# Patient Record
Sex: FEMALE | Race: WHITE | HISPANIC OR LATINO | Employment: FULL TIME | URBAN - METROPOLITAN AREA
[De-identification: names, ages, dates, MRNs, and addresses within clinical notes are randomized per-mention and may not be internally consistent; named-entity substitution may affect disease eponyms.]

---

## 2017-08-29 ENCOUNTER — NON-PROVIDER VISIT (OUTPATIENT)
Dept: URGENT CARE | Facility: PHYSICIAN GROUP | Age: 22
End: 2017-08-29

## 2017-08-29 DIAGNOSIS — Z02.1 PRE-EMPLOYMENT DRUG SCREENING: ICD-10-CM

## 2017-08-29 LAB
AMP AMPHETAMINE: NORMAL
COC COCAINE: NORMAL
INT CON NEG: NEGATIVE
INT CON POS: POSITIVE
MET METHAMPHETAMINES: NORMAL
OPI OPIATES: NORMAL
PCP PHENCYCLIDINE: NORMAL
POC DRUG COMMENT 753798-OCCUPATIONAL HEALTH: NORMAL
THC: NORMAL

## 2017-08-29 PROCEDURE — 80305 DRUG TEST PRSMV DIR OPT OBS: CPT | Performed by: PHYSICIAN ASSISTANT

## 2018-02-27 ENCOUNTER — NON-PROVIDER VISIT (OUTPATIENT)
Dept: URGENT CARE | Facility: PHYSICIAN GROUP | Age: 23
End: 2018-02-27

## 2018-02-27 DIAGNOSIS — Z02.1 PRE-EMPLOYMENT DRUG SCREENING: Primary | ICD-10-CM

## 2018-02-27 LAB
AMP AMPHETAMINE: NORMAL
COC COCAINE: NORMAL
INT CON NEG: NORMAL
INT CON POS: NORMAL
MET METHAMPHETAMINES: NORMAL
OPI OPIATES: NORMAL
PCP PHENCYCLIDINE: NORMAL
POC DRUG COMMENT 753798-OCCUPATIONAL HEALTH: NEGATIVE
THC: NORMAL

## 2018-02-27 PROCEDURE — 80305 DRUG TEST PRSMV DIR OPT OBS: CPT | Performed by: PHYSICIAN ASSISTANT

## 2018-02-27 NOTE — PROGRESS NOTES
Kathya Rogel is a 22 y.o. female here for a non-provider visit for Drug Screen 2/27/18    If abnormal was an in office provider notified today (if so, indicate provider)? No  Routed to PCP? No

## 2018-07-15 ENCOUNTER — HOSPITAL ENCOUNTER (EMERGENCY)
Facility: MEDICAL CENTER | Age: 23
End: 2018-07-15
Attending: EMERGENCY MEDICINE
Payer: MEDICAID

## 2018-07-15 ENCOUNTER — APPOINTMENT (OUTPATIENT)
Dept: RADIOLOGY | Facility: MEDICAL CENTER | Age: 23
End: 2018-07-15
Attending: EMERGENCY MEDICINE
Payer: MEDICAID

## 2018-07-15 VITALS
HEIGHT: 62 IN | RESPIRATION RATE: 18 BRPM | BODY MASS INDEX: 34.28 KG/M2 | HEART RATE: 87 BPM | SYSTOLIC BLOOD PRESSURE: 110 MMHG | OXYGEN SATURATION: 98 % | DIASTOLIC BLOOD PRESSURE: 72 MMHG | WEIGHT: 186.29 LBS | TEMPERATURE: 97.6 F

## 2018-07-15 DIAGNOSIS — Z3A.17 17 WEEKS GESTATION OF PREGNANCY: ICD-10-CM

## 2018-07-15 PROCEDURE — 99284 EMERGENCY DEPT VISIT MOD MDM: CPT

## 2018-07-15 PROCEDURE — 76817 TRANSVAGINAL US OBSTETRIC: CPT

## 2018-07-15 ASSESSMENT — PAIN SCALES - GENERAL: PAINLEVEL_OUTOF10: 0

## 2018-07-15 NOTE — ED NOTES
Patient for discharge home. No acute distress, currently able to ambulate self without assistance.

## 2018-07-15 NOTE — DISCHARGE INSTRUCTIONS
Pregnancy  If you are planning on getting pregnant, it is a good idea to make a preconception appointment with your caregiver to discuss having a healthy lifestyle before getting pregnant. This includes diet, weight, exercise, taking prenatal vitamins (especially folic acid, which helps prevent brain and spinal cord defects), avoiding alcohol, smoking and illegal drugs, medical problems (diabetes, convulsions), family history of genetic problems, working conditions, and immunizations. It is better to have knowledge of these things and do something about them before getting pregnant.  During your pregnancy, it is important to follow certain guidelines in order to have a healthy baby. It is very important to get good prenatal care and follow your caregiver's instructions. Prenatal care includes all the medical care you receive before your baby's birth. This helps to prevent problems during the pregnancy and childbirth.  HOME CARE INSTRUCTIONS   · Start your prenatal visits by the 12th week of pregnancy or earlier, if possible. At first, appointments are usually scheduled monthly. They become more frequent in the last 2 months before delivery. It is important that you keep your caregiver's appointments and follow your caregiver's instructions regarding medication use, exercise, and diet.  · During pregnancy, you are providing food for you and your baby. Eat a regular, well-balanced diet. Choose foods such as meat, fish, milk and other dairy products, vegetables, fruits, whole-grain breads and cereals. Your caregiver will inform you of the ideal weight gain depending on your current height and weight. Drink lots of liquids. Try to drink 8 glasses of water a day.  · Alcohol is associated with a number of birth defects including fetal alcohol syndrome. It is best to avoid alcohol completely. Smoking will cause low birth rate and prematurity. Use of alcohol and nicotine during your pregnancy also increases the chances that  your child will be chemically dependent later in their life and may contribute to SIDS (Sudden Infant Death Syndrome).  · Do not use illegal drugs.  · Only take prescription or over-the-counter medications that are recommended by your caregiver. Other medications can cause genetic and physical problems in the baby.  · Morning sickness can often be helped by keeping soda crackers at the bedside. Eat a few before getting up in the morning.  · A sexual relationship may be continued until near the end of pregnancy if there are no other problems such as early (premature) leaking of amniotic fluid from the membranes, vaginal bleeding, painful intercourse or belly (abdominal) pain.  · Exercise regularly. Check with your caregiver if you are unsure of the safety of some of your exercises.  · Do not use hot tubs, steam rooms or saunas. These increase the risk of fainting and hurting yourself and the baby. Swimming is OK for exercise. Get plenty of rest, including afternoon naps when possible, especially in the third trimester.  · Avoid toxic odors and chemicals.  · Do not wear high heels. They may cause you to lose your balance and fall.  · Do not lift over 5 pounds. If you do lift anything, lift with your legs and thighs, not your back.  · Avoid long trips, especially in the third trimester.  · If you have to travel out of the city or state, take a copy of your medical records with you.  SEEK IMMEDIATE MEDICAL CARE IF:   · You develop an unexplained oral temperature above 102° F (38.9° C), or as your caregiver suggests.  · You have leaking of fluid from the vagina. If leaking membranes are suspected, take your temperature and inform your caregiver of this when you call.  · There is vaginal spotting or bleeding. Notify your caregiver of the amount and how many pads are used.  · You continue to feel sick to your stomach (nauseous) and have no relief from remedies suggested, or you throw up (vomit) blood or coffee ground like  materials.  · You develop upper abdominal pain.  · You have round ligament discomfort in the lower abdominal area. This still must be evaluated by your caregiver.  · You feel contractions of the uterus.  · You do not feel the baby move, or there is less movement than before.  · You have painful urination.  · You have abnormal vaginal discharge.  · You have persistent diarrhea.  · You get a severe headache.  · You have problems with your vision.  · You develop muscle weakness.  · You feel dizzy and faint.  · You develop shortness of breath.  · You develop chest pain.  · You have back pain that travels down to your leg and feet.  · You feel irregular or a very fast heartbeat.  · You develop excessive weight gain in a short period of time (5 pounds in 3 to 5 days).  · You are involved in a domestic violence situation.  Document Released: 12/18/2006 Document Revised: 06/18/2013 Document Reviewed: 06/11/2010  [employer]® Patient Information ©2014 [employer].
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
## 2018-12-06 ENCOUNTER — HOSPITAL ENCOUNTER (OUTPATIENT)
Facility: MEDICAL CENTER | Age: 23
End: 2018-12-07
Payer: MEDICAID

## 2018-12-07 ENCOUNTER — HOSPITAL ENCOUNTER (OUTPATIENT)
Facility: MEDICAL CENTER | Age: 23
End: 2018-12-07
Payer: MEDICAID

## 2018-12-07 VITALS
SYSTOLIC BLOOD PRESSURE: 95 MMHG | DIASTOLIC BLOOD PRESSURE: 68 MMHG | TEMPERATURE: 97.9 F | HEIGHT: 62 IN | HEART RATE: 94 BPM | WEIGHT: 205 LBS | BODY MASS INDEX: 37.73 KG/M2

## 2018-12-07 LAB — CRYSTALS AMN MICRO: NORMAL

## 2018-12-07 PROCEDURE — 89060 EXAM SYNOVIAL FLUID CRYSTALS: CPT

## 2018-12-07 PROCEDURE — 59025 FETAL NON-STRESS TEST: CPT

## 2018-12-07 NOTE — PROGRESS NOTES
, EDC 12/15, GA 38w6d. Pt presents to L&D with c/o LOF since . Pt denies VB, reports occasional mild UCs, and reports + Fetal movement. Pt escorted to triage room, oriented to room and unit, and external monitors applied. POC discussed with pt and s/o and encouraged to state needs or questions at any time.    0010 Sterile speculum exam by ROSIE Nugent RN, no pooling of fluid noted. Sample collected for fern test. SVE FT/floating, posterior, firm.    0015 [facility] on-call service called, Dr. oCppola paged.    0100 Call received from Dr. Coppola, report given.    0216 Dr. Coppola at bedside, POC discussed with pt and s/o, assessment completed.    0220 Dr. Coppola on unit, D/C order received.    0228 General L&D D/C instructions reviewed with pt and s/o who state understanding. Pt d/c to self with s/o.

## 2018-12-07 NOTE — PROGRESS NOTES
"[facility] TRIAGE PROGRESS NOTE    PATIENT ID:  NAME:  Kathya Rogel  MRN:               [id number]  YOB: 1995     23 y.o. female  at Unknown.    Subjective: 24yo  at 38wk6 days by first trimester US with EDC of 12/15/18 who presents for possible leaking of fluids. Patient reports she went to the bathroom, laid down, noticed she was wet, got up and noticed clear fluids had soaked through her menstrual pad. She denies contractions, vaginal bleeding, and is feeling the baby move.    negative  For CTXS.   negative Feels pain   equivocal for LOF  negative for vaginal bleeding.   positive for fetal movement    ROS: Patient denies any fever chills, nausea, vomiting, headache, chest pain, shortness of breath, or dysuria or unusual swelling of hands or feet.     Objective:    Vitals:    18 2359   BP: (!) 95/68   Pulse: 94   Temp: 36.6 °C (97.9 °F)   TempSrc: Temporal   Weight: 93 kg (205 lb)   Height: 1.575 m (5' 2\")     Temp (24hrs), Av.6 °C (97.9 °F), Min:36.6 °C (97.9 °F), Max:36.6 °C (97.9 °F)    General: No acute distress, resting comfortably in bed.  HEENT: normocephalic, nontraumatic, PERRLA, EOMI  Cardiovascular: Heart RRR with no murmurs, rubs or gallops. Distal Pulses 2+  Respiratory: symmetric chest expansion, lungs CTAB, with no wheezes, rales, rhonci  Abdomen: gravid, nontender  Musculoskeletal: strength 5/5 in four extremities  Neuro: non focal with no numbness, tingling or changes in sensation    Speculum exam: No pooling in posterior fornix   Cervix:  FT/thick/high and posterior  [place]: Irritable   FHRM: baseline 120s with accels to 160, mod variability, no decels    Labs: negative fern    Assessment: 24yo  at 38wk6 days by first trimester US with EDC of 12/15/18 who presents for possible leaking of fluids. No pooling, negative fern, patient FT/thick/high, with no contractions.    Plan:   1. Discussed findings with patient and offered " reassurance  2. Reactive NST obtained  3. Patient to follow up at scheduled appointment tomorrow at [facility]  4. Patient discharged home with return precautions    Discussed case with Dr. Vergara, on-call attending, who agreed with assessment and plan to discharge patient home with return precautions.

## 2018-12-21 ENCOUNTER — APPOINTMENT (OUTPATIENT)
Dept: OBGYN | Facility: MEDICAL CENTER | Age: 23
End: 2018-12-21
Payer: MEDICAID

## 2018-12-21 ENCOUNTER — HOSPITAL ENCOUNTER (OUTPATIENT)
Facility: MEDICAL CENTER | Age: 23
End: 2018-12-22
Payer: MEDICAID

## 2018-12-21 PROCEDURE — 59200 INSERT CERVICAL DILATOR: CPT

## 2018-12-21 PROCEDURE — 59025 FETAL NON-STRESS TEST: CPT

## 2018-12-21 PROCEDURE — 700101 HCHG RX REV CODE 250: Performed by: STUDENT IN AN ORGANIZED HEALTH CARE EDUCATION/TRAINING PROGRAM

## 2018-12-21 RX ADMIN — DINOPROSTONE 5 MG: 20 SUPPOSITORY VAGINAL at 22:21

## 2018-12-22 ENCOUNTER — APPOINTMENT (OUTPATIENT)
Dept: OBGYN | Facility: MEDICAL CENTER | Age: 23
End: 2018-12-22
Payer: MEDICAID

## 2018-12-22 ENCOUNTER — HOSPITAL ENCOUNTER (INPATIENT)
Facility: MEDICAL CENTER | Age: 23
LOS: 2 days | End: 2018-12-24
Admitting: OBSTETRICS & GYNECOLOGY
Payer: MEDICAID

## 2018-12-22 ENCOUNTER — APPOINTMENT (OUTPATIENT)
Dept: RADIOLOGY | Facility: MEDICAL CENTER | Age: 23
End: 2018-12-22
Attending: STUDENT IN AN ORGANIZED HEALTH CARE EDUCATION/TRAINING PROGRAM
Payer: MEDICAID

## 2018-12-22 VITALS
HEIGHT: 62 IN | TEMPERATURE: 96.7 F | DIASTOLIC BLOOD PRESSURE: 89 MMHG | BODY MASS INDEX: 37.73 KG/M2 | WEIGHT: 205 LBS | SYSTOLIC BLOOD PRESSURE: 113 MMHG | HEART RATE: 92 BPM

## 2018-12-22 LAB
BASOPHILS # BLD AUTO: 0.6 % (ref 0–1.8)
BASOPHILS # BLD: 0.09 K/UL (ref 0–0.12)
CRYSTALS AMN MICRO: NORMAL
EOSINOPHIL # BLD AUTO: 0.27 K/UL (ref 0–0.51)
EOSINOPHIL NFR BLD: 1.7 % (ref 0–6.9)
ERYTHROCYTE [DISTWIDTH] IN BLOOD BY AUTOMATED COUNT: 41.3 FL (ref 35.9–50)
HCT VFR BLD AUTO: 34.8 % (ref 37–47)
HGB BLD-MCNC: 10.8 G/DL (ref 12–16)
HOLDING TUBE BB 8507: NORMAL
IMM GRANULOCYTES # BLD AUTO: 0.15 K/UL (ref 0–0.11)
IMM GRANULOCYTES NFR BLD AUTO: 0.9 % (ref 0–0.9)
LYMPHOCYTES # BLD AUTO: 2.7 K/UL (ref 1–4.8)
LYMPHOCYTES NFR BLD: 16.9 % (ref 22–41)
MCH RBC QN AUTO: 23.1 PG (ref 27–33)
MCHC RBC AUTO-ENTMCNC: 31 G/DL (ref 33.6–35)
MCV RBC AUTO: 74.5 FL (ref 81.4–97.8)
MONOCYTES # BLD AUTO: 0.79 K/UL (ref 0–0.85)
MONOCYTES NFR BLD AUTO: 5 % (ref 0–13.4)
NEUTROPHILS # BLD AUTO: 11.93 K/UL (ref 2–7.15)
NEUTROPHILS NFR BLD: 74.9 % (ref 44–72)
NRBC # BLD AUTO: 0 K/UL
NRBC BLD-RTO: 0 /100 WBC
PLATELET # BLD AUTO: 489 K/UL (ref 164–446)
PMV BLD AUTO: 10.5 FL (ref 9–12.9)
RBC # BLD AUTO: 4.67 M/UL (ref 4.2–5.4)
WBC # BLD AUTO: 15.9 K/UL (ref 4.8–10.8)

## 2018-12-22 PROCEDURE — 93005 ELECTROCARDIOGRAM TRACING: CPT | Performed by: STUDENT IN AN ORGANIZED HEALTH CARE EDUCATION/TRAINING PROGRAM

## 2018-12-22 PROCEDURE — 10907ZC DRAINAGE OF AMNIOTIC FLUID, THERAPEUTIC FROM PRODUCTS OF CONCEPTION, VIA NATURAL OR ARTIFICIAL OPENING: ICD-10-PCS | Performed by: OBSTETRICS & GYNECOLOGY

## 2018-12-22 PROCEDURE — 700111 HCHG RX REV CODE 636 W/ 250 OVERRIDE (IP): Performed by: OBSTETRICS & GYNECOLOGY

## 2018-12-22 PROCEDURE — 85025 COMPLETE CBC W/AUTO DIFF WBC: CPT

## 2018-12-22 PROCEDURE — 700105 HCHG RX REV CODE 258: Performed by: OBSTETRICS & GYNECOLOGY

## 2018-12-22 PROCEDURE — 76815 OB US LIMITED FETUS(S): CPT

## 2018-12-22 PROCEDURE — A9270 NON-COVERED ITEM OR SERVICE: HCPCS | Performed by: STUDENT IN AN ORGANIZED HEALTH CARE EDUCATION/TRAINING PROGRAM

## 2018-12-22 PROCEDURE — 700111 HCHG RX REV CODE 636 W/ 250 OVERRIDE (IP)

## 2018-12-22 PROCEDURE — 770002 HCHG ROOM/CARE - OB PRIVATE (112)

## 2018-12-22 PROCEDURE — 10H07YZ INSERTION OF OTHER DEVICE INTO PRODUCTS OF CONCEPTION, VIA NATURAL OR ARTIFICIAL OPENING: ICD-10-PCS | Performed by: OBSTETRICS & GYNECOLOGY

## 2018-12-22 PROCEDURE — 700102 HCHG RX REV CODE 250 W/ 637 OVERRIDE(OP): Performed by: STUDENT IN AN ORGANIZED HEALTH CARE EDUCATION/TRAINING PROGRAM

## 2018-12-22 PROCEDURE — 89060 EXAM SYNOVIAL FLUID CRYSTALS: CPT

## 2018-12-22 RX ORDER — MISOPROSTOL 200 UG/1
800 TABLET ORAL
Status: COMPLETED | OUTPATIENT
Start: 2018-12-22 | End: 2018-12-23

## 2018-12-22 RX ORDER — ACETAMINOPHEN 500 MG
500 TABLET ORAL EVERY 6 HOURS PRN
Status: DISCONTINUED | OUTPATIENT
Start: 2018-12-22 | End: 2018-12-24 | Stop reason: HOSPADM

## 2018-12-22 RX ORDER — ONDANSETRON 4 MG/1
4 TABLET, ORALLY DISINTEGRATING ORAL EVERY 6 HOURS PRN
Status: DISCONTINUED | OUTPATIENT
Start: 2018-12-22 | End: 2018-12-24 | Stop reason: HOSPADM

## 2018-12-22 RX ORDER — HYDROXYZINE 50 MG/1
50 TABLET, FILM COATED ORAL EVERY 6 HOURS PRN
Status: DISCONTINUED | OUTPATIENT
Start: 2018-12-22 | End: 2018-12-23 | Stop reason: HOSPADM

## 2018-12-22 RX ORDER — ALUMINA, MAGNESIA, AND SIMETHICONE 2400; 2400; 240 MG/30ML; MG/30ML; MG/30ML
30 SUSPENSION ORAL EVERY 6 HOURS PRN
Status: DISCONTINUED | OUTPATIENT
Start: 2018-12-22 | End: 2018-12-23 | Stop reason: HOSPADM

## 2018-12-22 RX ORDER — OXYTOCIN 10 [USP'U]/ML
10 INJECTION, SOLUTION INTRAMUSCULAR; INTRAVENOUS
Status: DISCONTINUED | OUTPATIENT
Start: 2018-12-22 | End: 2018-12-23 | Stop reason: HOSPADM

## 2018-12-22 RX ORDER — ONDANSETRON 2 MG/ML
4 INJECTION INTRAMUSCULAR; INTRAVENOUS EVERY 6 HOURS PRN
Status: DISCONTINUED | OUTPATIENT
Start: 2018-12-22 | End: 2018-12-24 | Stop reason: HOSPADM

## 2018-12-22 RX ORDER — IBUPROFEN 600 MG/1
600 TABLET ORAL EVERY 6 HOURS PRN
Status: DISCONTINUED | OUTPATIENT
Start: 2018-12-22 | End: 2018-12-24 | Stop reason: HOSPADM

## 2018-12-22 RX ORDER — ROPIVACAINE HYDROCHLORIDE 2 MG/ML
INJECTION, SOLUTION EPIDURAL; INFILTRATION; PERINEURAL
Status: COMPLETED
Start: 2018-12-22 | End: 2018-12-22

## 2018-12-22 RX ORDER — SODIUM CHLORIDE, SODIUM LACTATE, POTASSIUM CHLORIDE, CALCIUM CHLORIDE 600; 310; 30; 20 MG/100ML; MG/100ML; MG/100ML; MG/100ML
INJECTION, SOLUTION INTRAVENOUS CONTINUOUS
Status: DISCONTINUED | OUTPATIENT
Start: 2018-12-22 | End: 2018-12-24 | Stop reason: HOSPADM

## 2018-12-22 RX ADMIN — HYDROXYZINE HYDROCHLORIDE 50 MG: 50 TABLET, FILM COATED ORAL at 20:39

## 2018-12-22 RX ADMIN — BENZOCAINE AND MENTHOL 1 LOZENGE: 15; 3.6 LOZENGE ORAL at 23:26

## 2018-12-22 RX ADMIN — SODIUM CHLORIDE, POTASSIUM CHLORIDE, SODIUM LACTATE AND CALCIUM CHLORIDE: 600; 310; 30; 20 INJECTION, SOLUTION INTRAVENOUS at 20:00

## 2018-12-22 RX ADMIN — ACETAMINOPHEN 500 MG: 500 TABLET ORAL at 09:25

## 2018-12-22 RX ADMIN — ROPIVACAINE HYDROCHLORIDE 100 ML: 2 INJECTION, SOLUTION EPIDURAL; INFILTRATION at 20:50

## 2018-12-22 RX ADMIN — Medication 2 MILLI-UNITS/MIN: at 21:35

## 2018-12-22 RX ADMIN — SODIUM CHLORIDE, POTASSIUM CHLORIDE, SODIUM LACTATE AND CALCIUM CHLORIDE: 600; 310; 30; 20 INJECTION, SOLUTION INTRAVENOUS at 20:31

## 2018-12-22 ASSESSMENT — COPD QUESTIONNAIRES
IN THE PAST 12 MONTHS DO YOU DO LESS THAN YOU USED TO BECAUSE OF YOUR BREATHING PROBLEMS: DISAGREE/UNSURE
DURING THE PAST 4 WEEKS HOW MUCH DID YOU FEEL SHORT OF BREATH: NONE/LITTLE OF THE TIME
HAVE YOU SMOKED AT LEAST 100 CIGARETTES IN YOUR ENTIRE LIFE: NO/DON'T KNOW
DO YOU EVER COUGH UP ANY MUCUS OR PHLEGM?: NO/ONLY WITH OCCASIONAL COLDS OR INFECTIONS

## 2018-12-22 ASSESSMENT — PATIENT HEALTH QUESTIONNAIRE - PHQ9
1. LITTLE INTEREST OR PLEASURE IN DOING THINGS: NOT AT ALL
1. LITTLE INTEREST OR PLEASURE IN DOING THINGS: NOT AT ALL
2. FEELING DOWN, DEPRESSED, IRRITABLE, OR HOPELESS: NOT AT ALL
SUM OF ALL RESPONSES TO PHQ9 QUESTIONS 1 AND 2: 0
SUM OF ALL RESPONSES TO PHQ9 QUESTIONS 1 AND 2: 0
2. FEELING DOWN, DEPRESSED, IRRITABLE, OR HOPELESS: NOT AT ALL
2. FEELING DOWN, DEPRESSED, IRRITABLE, OR HOPELESS: NOT AT ALL
1. LITTLE INTEREST OR PLEASURE IN DOING THINGS: NOT AT ALL
SUM OF ALL RESPONSES TO PHQ9 QUESTIONS 1 AND 2: 0

## 2018-12-22 ASSESSMENT — PAIN SCALES - GENERAL
PAINLEVEL_OUTOF10: 0
PAINLEVEL_OUTOF10: 3
PAINLEVEL_OUTOF10: 0
PAINLEVEL_OUTOF10: 0

## 2018-12-22 ASSESSMENT — LIFESTYLE VARIABLES
ALCOHOL_USE: NO
EVER_SMOKED: NEVER

## 2018-12-22 NOTE — PROGRESS NOTES
INTERVAL - balloon still in place  Patient w/ intermittently broken tracing    FHT's - baseline 140-150, moderate variability w/ good accels  Continue current management

## 2018-12-22 NOTE — PROGRESS NOTES
"S: Patient with uncomfortable ctx approx every 8 minutes.  No decreased FM, no LOF, no bleeding.    O:  /65   Pulse (!) 114   Temp 36.4 °C (97.6 °F) (Temporal)   Ht 1.575 m (5' 2\")   Wt 93 kg (205 lb)   BMI 37.49 kg/m²   SVE: 1/70%/-2  FHTs: 140s, accels to 170s, no decels, Cat 1    Patient's cervical exam assessed, balloon catheter introduced, uterine balloon inflated to 60 cc, external balloon inflated to 60 cc.  Patient tolerated procedure well, no decels appreciated on FHTs.    A/P:  22 yo  @ 41w0d, GBS neg s/p gel placement last night   - Balloon placed at 0900  - Continuous fetal monitoring, ok to be off monitor for 30 min intervals to ambulate  - Light diet as tolerated  - Will reassess in several hours  - anticipate   "

## 2018-12-22 NOTE — PROGRESS NOTES
"0620 - 24 y/o  EDC 12/15/18, EGA 41.0, here to L&D room 216 with fob Troy and family. Pt here for scheduled IOL - post dates. EFM/TOCO applied, Patient states positive FM. Denies vaginal Bleeding but reporting small amount of clear liquid, however had a gel placed last night. UC's noted Q2-4 minutes. VSS. Pt reporting feeling \"a head cold\" and asking for tylenol and a decongestant.   0630 - IV started, labs collected, admit profile completed.   0700 - SSE - no pooling noted, fern collected. SVE - /-2.   0715 - Dr Chaudhry on unit. Updates given. Dr Chaudhry to consult with Dr Vergara on POC. At this time will hold off on sending the fern to lab.   0900 - Dr Vergara and Dr Chaudhry at bedside. SVE - /-2. Cervical Ripening Balloon placed, 60 cc -V, 60 cc U. Pt educated on potential cramping and POC. All questions answered. Per MD, ok to be off the monitor for 30 min for an occasional walk. Ok for regular diet.  1345 - Report given to HAL Barraza RN. All questions answered.     "

## 2018-12-22 NOTE — PROGRESS NOTES
2040 -  here with EDC of 12/15/2018 = 40.6 weeks reporting for an OP gel. Reports positive FM but has been decreased since 1200 today, denies UC's, LOF or VB. Taken to LDA3 accompanied by ROLAND Simmons, instructed to change and call out when ready.    - monitors applied x2, VS taken.    - Dr. Chaudhry on unit.   - SVE /-3.    -  Dr. Chaudhry at bedside, FHTs reviewed by provider.     - VAS, immediately followed by FM and accel.     - Dr. Chaudhry at bedside, gel placed by provider.   0 - Dr. Chaudhry on unit, may discharge after another 30 minutes of monitoring if no additional decels seen.   0007 - discharge instructions given as follows:  If you think you are in labor, time contractions (laying on your left side) from the beginning of one contraction to the beginning of the next contraction for at least one hour.   Increase fluid intake:10-12 8oz glasses non-caffeinated fluid per day.  Report any pressure or burning on urination to your physician.   Monitor fetal movement: You should be able to count 10 sets of movements in 2 hrs. If you notice an absence or marked decrease in fetal movement, call your physician or go to the hospital.   Report any sudden, sharp abdominal pain.   Report any bleeding, spotting or pinkish discharge is normal after vaginal exam and or sexual intercourse.   Call MD or return to unit if:  You have regular contractions that get progressively closer, longer and stronger.   Your water breaks (remember time and color).   You have bleeding like a period.  Decreased or absent fetal movement.   Questions answered, understanding verbalized.   0015 - discharged home with family in stable walking condition.

## 2018-12-22 NOTE — H&P
"History and Physical      Kathya Rogel is a 23 y.o. year old female  at 41w0d who presents for IOL for post-dates s/p gel placement last night.  Mother is A+, ABS neg, HIV/RPR/HbsAg neg, Rub non-immune, GBS neg.    Subjective:   positive  For CTXS.   positive Feels pain   negative for LOF  negative for vaginal bleeding.   positive for fetal movement    No LMP recorded. Patient is pregnant.  12/15/2018, by Other Basis    ROS: Patient denies fever, chills, nausea, vomiting , headache, visual disturbance, or dysuria.    Past Medical History:   Diagnosis Date   • Anxiety    • Plantar fasciitis      History reviewed. No pertinent surgical history.  OB History    Para Term  AB Living   1             SAB TAB Ectopic Molar Multiple Live Births                    # Outcome Date GA Lbr Wolf/2nd Weight Sex Delivery Anes PTL Lv   1 Current                 Social History     Social History   • Marital status: Single     Spouse name: N/A   • Number of children: N/A   • Years of education: N/A     Occupational History   • Not on file.     Social History Main Topics   • Smoking status: Never Smoker   • Smokeless tobacco: Never Used   • Alcohol use No   • Drug use: No   • Sexual activity: Not on file     Other Topics Concern   • Not on file     Social History Narrative   • No narrative on file     Allergies: Patient has no known allergies.  No current facility-administered medications for this encounter.     Prenatal care with [facility] starting 2018 with following problems: None      Objective:      Height 1.575 m (5' 2\"), weight 93 kg (205 lb).    General:   Afebrile, NAD   Skin:   No rash or lesion   HEENT:  NCAT, EOMI, non-icteric, mmm   Lungs:   CTAB   Heart:   RRR, NMGR   Abdomen:   gravid, NT   EFW:  3800g   Pelvis:  adequate with gynecoid pelvis   FHT:  140s BPM   Uterine Size: S=D   Presentations: Cephalic   Cervix:     Dilation: 1cm    Effacement: 75%    Station:  -3    " Consistency: Firm    Position: Posterior     Lab Review  Lab:   Blood type: A+     Recent Results (from the past 5880 hour(s))   FERN TEST    Collection Time: 18 12:10 AM   Result Value Ref Range    Fern Test On Amniotic Fluid see below Not present        Assessment:   Kathya Rogel at 41w0d  Labor status: Early latent labor.  GBS negative  Obstetrical history significant for UTI with MATTHEW early in pregnancy.      Plan:     Admit to L&D  Patient s/p prostaglandin gel placement yesterday pm  Given unfavorable cervical exam for IOL will proceed with balloon placement for cervical ripening  Tylenol PRN cold symptoms  Pt requesting epidural anesthesia when appropriate  Anticipate

## 2018-12-22 NOTE — CARE PLAN
Problem: Knowledge Deficit  Goal: Patient/Support person demonstrates understanding regarding the progression of labor, available options and participates in decision-making process  Outcome: PROGRESSING AS EXPECTED  POC discussed with pt, pt aware of plan and all questions answered.     Problem: Pain  Goal: Alleviation of Pain or a reduction in pain to the patient's comfort goal  Outcome: PROGRESSING AS EXPECTED  Pain management discussed with pt, pt unsure of plan but is aware of options. Pt encouraged to notify RN of any changes.

## 2018-12-22 NOTE — PROGRESS NOTES
INTERVAL  Patient comfortably sleeping and balloon still in place    FHT's excellent - baseline 140-150, moderate variability, many accels - intermittent appropriate sleep cycle followed by repeat accels and no decelerations  Will continue current management.

## 2018-12-22 NOTE — PROGRESS NOTES
"[facility]IV[facility] TRIAGE PROGRESS NOTE    PATIENT ID:  NAME:  Kathya Rogel  MRN:               [id number]  YOB: 1995     23 y.o. female  at 39w6d by LMP c/w 12w US, A+, ABS neg, HIV/RPR/HBsAg neg, Rub non-immune, GBS neg here for prostaglandin gel placement and IOL for post-dates.      Subjective: Pt complains of feeling like she has a cold today with increased fatigue.  No HA, no N/V, no diarrhea/constipation.  She states she has not noticed much fetal movement today but thinks it may be due to her feeling unwell.    Pregnancy complicated by an early UTI, treated with antibiotics with a MATTHEW.    negative  For CTXS.   negative Feels pain   negative for LOF  negative for vaginal bleeding.   equivocal for fetal movement    ROS: Patient denies any fever chills, nausea, vomiting, headache, chest pain, shortness of breath, or dysuria or unusual swelling of hands or feet.     Objective:    Vitals:    18 2000 18 2104   BP:  124/76 112/69   Pulse:  (!) 122 (!) 105   Temp:  35.9 °C (96.7 °F)    TempSrc:  Temporal    Weight: 93 kg (205 lb)     Height: 1.575 m (5' 2.01\")       No data recorded.    General: No acute distress, resting comfortably in bed.  HEENT: normocephalic, nontraumatic, PERRLA, EOMI  Cardiovascular: Heart RRR with no murmurs, rubs or gallops.   Respiratory: symmetric chest expansion, lungs CTAB, with no wheezes, rales, rhonci  Abdomen: gravid, nontender  Musculoskeletal: strength 5/5 in four extremities  Neuro: non focal with no numbness, tingling or changes in sensation    Cervix:  1cm/75%/-3  [place]: Uterine Contractions Q8 minutes.   FHRM: Baseline 140s, Accels to 170s, no decels, mod variability, Cat 1    Assessment: 23 y.o. female  at 39w6d by LMP c/w 12w US, A+, ABS neg, HIV/RPR/HBsAg neg, Rub non-immune, GBS neg here for prostaglandin gel placement and IOL for post-dates.     Plan:   1. Strip pre and post-gel reviewed with Dr. Vergara.  Gel " placed with minimal uterine activity and 1 variable decel after 1 hour continuous monitoring.  Will continue monitoring for another hour and then anticipate discharge to home.  2. Return precautions and instructions to return for induction of labor tomorrow am at 0600 discussed.  Patient instructed to return for decreased FM, vaginal bleeding, LOF, increased painful uterine contractions.          Discussed case with Dr. Vergara, Attending. Case was discussed and attending agreed with plan prior to discharge of patient.

## 2018-12-23 LAB — EKG IMPRESSION: NORMAL

## 2018-12-23 PROCEDURE — 303615 HCHG EPIDURAL/SPINAL ANESTHESIA FOR LABOR

## 2018-12-23 PROCEDURE — A9270 NON-COVERED ITEM OR SERVICE: HCPCS | Performed by: STUDENT IN AN ORGANIZED HEALTH CARE EDUCATION/TRAINING PROGRAM

## 2018-12-23 PROCEDURE — 700111 HCHG RX REV CODE 636 W/ 250 OVERRIDE (IP): Performed by: ANESTHESIOLOGY

## 2018-12-23 PROCEDURE — 700111 HCHG RX REV CODE 636 W/ 250 OVERRIDE (IP): Performed by: STUDENT IN AN ORGANIZED HEALTH CARE EDUCATION/TRAINING PROGRAM

## 2018-12-23 PROCEDURE — 700102 HCHG RX REV CODE 250 W/ 637 OVERRIDE(OP): Performed by: STUDENT IN AN ORGANIZED HEALTH CARE EDUCATION/TRAINING PROGRAM

## 2018-12-23 PROCEDURE — 59409 OBSTETRICAL CARE: CPT

## 2018-12-23 PROCEDURE — 770002 HCHG ROOM/CARE - OB PRIVATE (112)

## 2018-12-23 PROCEDURE — 700105 HCHG RX REV CODE 258: Performed by: OBSTETRICS & GYNECOLOGY

## 2018-12-23 PROCEDURE — 304965 HCHG RECOVERY SERVICES

## 2018-12-23 PROCEDURE — 3E033VJ INTRODUCTION OF OTHER HORMONE INTO PERIPHERAL VEIN, PERCUTANEOUS APPROACH: ICD-10-PCS | Performed by: OBSTETRICS & GYNECOLOGY

## 2018-12-23 PROCEDURE — 700111 HCHG RX REV CODE 636 W/ 250 OVERRIDE (IP): Performed by: OBSTETRICS & GYNECOLOGY

## 2018-12-23 PROCEDURE — 93010 ELECTROCARDIOGRAM REPORT: CPT | Performed by: INTERNAL MEDICINE

## 2018-12-23 RX ORDER — DEXTROSE, SODIUM CHLORIDE, SODIUM LACTATE, POTASSIUM CHLORIDE, AND CALCIUM CHLORIDE 5; .6; .31; .03; .02 G/100ML; G/100ML; G/100ML; G/100ML; G/100ML
INJECTION, SOLUTION INTRAVENOUS CONTINUOUS
Status: DISCONTINUED | OUTPATIENT
Start: 2018-12-23 | End: 2018-12-24 | Stop reason: HOSPADM

## 2018-12-23 RX ORDER — ACETAMINOPHEN 325 MG/1
1000 TABLET ORAL ONCE
Status: COMPLETED | OUTPATIENT
Start: 2018-12-23 | End: 2018-12-23

## 2018-12-23 RX ORDER — SODIUM CHLORIDE, SODIUM LACTATE, POTASSIUM CHLORIDE, AND CALCIUM CHLORIDE .6; .31; .03; .02 G/100ML; G/100ML; G/100ML; G/100ML
1000 INJECTION, SOLUTION INTRAVENOUS
Status: DISCONTINUED | OUTPATIENT
Start: 2018-12-23 | End: 2018-12-23 | Stop reason: HOSPADM

## 2018-12-23 RX ORDER — DEXTROSE, SODIUM CHLORIDE, SODIUM LACTATE, POTASSIUM CHLORIDE, AND CALCIUM CHLORIDE 5; .6; .31; .03; .02 G/100ML; G/100ML; G/100ML; G/100ML; G/100ML
INJECTION, SOLUTION INTRAVENOUS CONTINUOUS
Status: CANCELLED | OUTPATIENT
Start: 2018-12-23

## 2018-12-23 RX ORDER — ROPIVACAINE HYDROCHLORIDE 2 MG/ML
INJECTION, SOLUTION EPIDURAL; INFILTRATION; PERINEURAL CONTINUOUS
Status: DISCONTINUED | OUTPATIENT
Start: 2018-12-23 | End: 2018-12-24 | Stop reason: HOSPADM

## 2018-12-23 RX ORDER — SODIUM CHLORIDE, SODIUM LACTATE, POTASSIUM CHLORIDE, AND CALCIUM CHLORIDE .6; .31; .03; .02 G/100ML; G/100ML; G/100ML; G/100ML
250 INJECTION, SOLUTION INTRAVENOUS PRN
Status: DISCONTINUED | OUTPATIENT
Start: 2018-12-23 | End: 2018-12-23 | Stop reason: HOSPADM

## 2018-12-23 RX ORDER — ACETAMINOPHEN 325 MG/1
975 TABLET ORAL ONCE
Status: COMPLETED | OUTPATIENT
Start: 2018-12-23 | End: 2018-12-23

## 2018-12-23 RX ADMIN — MISOPROSTOL 800 MCG: 200 TABLET ORAL at 17:38

## 2018-12-23 RX ADMIN — IBUPROFEN 600 MG: 600 TABLET, FILM COATED ORAL at 18:25

## 2018-12-23 RX ADMIN — SODIUM CHLORIDE, POTASSIUM CHLORIDE, SODIUM LACTATE AND CALCIUM CHLORIDE: 600; 310; 30; 20 INJECTION, SOLUTION INTRAVENOUS at 04:44

## 2018-12-23 RX ADMIN — ONDANSETRON 4 MG: 2 INJECTION INTRAMUSCULAR; INTRAVENOUS at 15:23

## 2018-12-23 RX ADMIN — ACETAMINOPHEN 975 MG: 325 TABLET, FILM COATED ORAL at 03:03

## 2018-12-23 RX ADMIN — Medication 125 ML/HR: at 18:25

## 2018-12-23 RX ADMIN — Medication 18 MILLI-UNITS/MIN: at 07:31

## 2018-12-23 RX ADMIN — ACETAMINOPHEN 975 MG: 325 TABLET, FILM COATED ORAL at 15:51

## 2018-12-23 RX ADMIN — SODIUM CHLORIDE, SODIUM LACTATE, POTASSIUM CHLORIDE, CALCIUM CHLORIDE AND DEXTROSE MONOHYDRATE: 5; 600; 310; 30; 20 INJECTION, SOLUTION INTRAVENOUS at 07:32

## 2018-12-23 RX ADMIN — SODIUM CHLORIDE, SODIUM LACTATE, POTASSIUM CHLORIDE, CALCIUM CHLORIDE AND DEXTROSE MONOHYDRATE: 5; 600; 310; 30; 20 INJECTION, SOLUTION INTRAVENOUS at 15:33

## 2018-12-23 RX ADMIN — SODIUM CHLORIDE, POTASSIUM CHLORIDE, SODIUM LACTATE AND CALCIUM CHLORIDE: 600; 310; 30; 20 INJECTION, SOLUTION INTRAVENOUS at 01:36

## 2018-12-23 RX ADMIN — ROPIVACAINE HYDROCHLORIDE: 2 INJECTION, SOLUTION EPIDURAL; INFILTRATION at 08:59

## 2018-12-23 RX ADMIN — Medication 350 ML/HR: at 17:58

## 2018-12-23 RX ADMIN — ACETAMINOPHEN 500 MG: 500 TABLET ORAL at 23:25

## 2018-12-23 ASSESSMENT — PAIN SCALES - GENERAL: PAINLEVEL_OUTOF10: 4

## 2018-12-23 NOTE — PROGRESS NOTES
[facility] DELIV[facility] PROGRESS NOTE    PATIENT ID:  NAME:  Kathya oRgel  MRN:               [id number]  YOB: 1995     23 y.o. female  at 41w0d.    Subjective: Pt resting comfortably, few painful contractions.  Pregnancy complicated by none    equivocal  For CTXS.   equivocal Feels pain   negative for LOF  negative for vaginal bleeding.   positive for fetal movement    ROS: Patient denies any fever chills, nausea, vomiting, headache, chest pain, shortness of breath, or dysuria or unusual swelling of hands or feet.     Objective:    Vitals:    18 1350 18 1644 18 1645 18 1853   BP:  109/59  118/67   Pulse:  (!) 118  (!) 110   Temp: 37 °C (98.6 °F)  37.1 °C (98.8 °F) 36.7 °C (98 °F)   TempSrc: Temporal  Temporal Temporal   Weight:       Height:         Temp (24hrs), Av.6 °C (97.9 °F), Min:35.9 °C (96.7 °F), Max:37.1 °C (98.8 °F)      Cervix:  4cm/80%/-2  [place]: Uterine Contractions Q10 minutes.   FHRM: Baseline 150s, Accels to 180s, no decels, mod variability    Assessment: 23 y.o. female  at 41w0d.    Plan:   1. Balloon catheter removed and bedside amniotomy performed with thick meconium on rupture  2. Start pitocin to proceed with IOL, epidural anesthesia as needed       Discussed case with Dr. Vergara, Attending. Case was discussed and attending agreed with plan

## 2018-12-23 NOTE — PROGRESS NOTES
Late Entry    1345- Bedside report received from SINAN Collins. Pt resting comfortably in bed with family at bedside. She is currently finishing lunch. Pt repositioned for comfort to eat. VSS. POC discussed and assumed, all questions answered at this time.   [id number]- Pt walked the unit with FOB  1545- Dr Magnuson called to check up on pt. Informed that pt is comfortable and sleeping at this time. No changes, balloon still in place and strip reactive.   1750- Dr Chaudhry at pt bedside to discuss POC. Informs pt that Dr Vergara would like to remove the balloon around 1900. Pt agrees with plan. All questions answered.   1900- Bedside report given to SINAN Clemens. Pt eating dinner with family at bedside.

## 2018-12-23 NOTE — PROGRESS NOTES
Pt comfortable with epidural  Cx 5/80/-2  IUPC re-placed  FHTS rective, reassuring  D5LR to hang  Will continue with pitocin    awestfall

## 2018-12-23 NOTE — PROGRESS NOTES
"Progress note:    S: Called for pt complaining of SOB and chest pain with deep inhalation following initiation of IVF.  States it is burning when she takes a deep breath in that she describes as feeling like she inhaled smoke accompanied by a cough. Denies feeling anxious at this time.    O:  /67   Pulse (!) 110   Temp 36.7 °C (98 °F) (Temporal)   Ht 1.575 m (5' 2\")   Wt 93 kg (205 lb)   BMI 37.49 kg/m²   Gen: Anxious affect, mild distress  CV: tachycardic, regular rhythm, no M/R/G  Resp: CTAB    A/P:   @ 41w0d with sudden onset chest pain and SOB  - EKG with sinus tachycardia, no ST changes  - cepacol lozenge   - Atarax for anxiety  - Will CTM   "

## 2018-12-23 NOTE — PROGRESS NOTES
0700-report received from NANCI Boone RN, care assumed, POC discussed  0720-order received for D5LR and a new bag of pitocin from Dr Vergara  0730-D5LR and new bag of pitocin started, pt educated, verbalized understanding  0800-Dr Vergara in room, IUPC replaced, SVE 80/-2  1030-SVE 5-//-2, report given to Dr Vergara, will wait until 1400 to recheck pt  1515-SVE 9/100/0, room set up for delivery  1630-Dr Vergara here, SVE complete/+3  1640-started pushing  1720- viable female per Dr Packer and Dr Vergara  1734-placenta  1900-report given to DES Leggett RN, POC discussed

## 2018-12-23 NOTE — CARE PLAN
Problem: Infection  Goal: Will remain free from infection  Outcome: PROGRESSING AS EXPECTED  Pt remains afebrile and no s/s of infection.    Problem: Knowledge Deficit  Goal: Patient/Support person demonstrates understanding regarding the progression of labor, available options and participates in decision-making process  Outcome: PROGRESSING AS EXPECTED  POC discussed and understanding verbalized.

## 2018-12-23 NOTE — PROGRESS NOTES
[facility] DELIV[facility] PROGRESS NOTE    PATIENT ID:  NAME:  Kathya Rogel  MRN:               [id number]  YOB: 1995     23 y.o. female  at 41w0d, IOL for post-dates    Subjective: Balloon in place, resting comfortably.    equivocal  For CTXS.   equivocal Feels pain   negative for LOF  negative for vaginal bleeding.   positive for fetal movement    Objective:    Vitals:    18 1348 18 1350 18 1644 18 1645   BP: 121/56  109/59    Pulse: (!) 120  (!) 118    Temp:  37 °C (98.6 °F)  37.1 °C (98.8 °F)   TempSrc:  Temporal  Temporal   Weight:       Height:           [place]: Uterine Contractions Q5 minutes.   FHRM: Baseline 150s, + accels, no decels, Cat 1  Pitocin: none  Pain control: none    Assessment: 23 y.o. female  at 41w0d IOL for post dates, s/p gel placement last night and balloon catheter placement 0920 this am    Plan:   1. Continue current management  2. Plan to remove balloon around 1900 and start pitocin if cervix favorable  3. Anticipate

## 2018-12-23 NOTE — PROGRESS NOTES
1900_Assumed pt care. POC reviewed and understanding verbalized.  1938_Dr Vergara and Dr Chaudhry @ bedsise. Balloon removed. SVE 4/80/-2. AROM thick mec. IUPC placed.  2009_ Dr Magnuson called to bedside to evaluate pt for c/o chest pain/pressure  2013_Dr Chaudhry @ bedside  2026_EKG technician @ bedside. EKG reviewed by Dr Chaudhry and WNL. Orders received to give atarax. Pt states she feels better.   2040_ Dr Humphrey @ bedside for epidural. Pt tolerated well  2115_ Leyva placed. SVE 4-5/80/-2  0115_SVE no change  0250_ Dr Chaudhry updated on pt's temp 100.1 oral and change in FHR baseline to 170's_180's. Will consult Dr Vergara and call back.  0252_ Dr Magnuson called back and orders received for 1000 mg of tylenol once now.  0525_ Dr Vergara @ bedside. Strip reviewed. SVE 5/80/-2 with some caput.  0700_ Report to KARLA Duran RN.

## 2018-12-23 NOTE — PROGRESS NOTES
[facility] LABOR AND DELIVERY PROGRESS NOTE    PATIENT ID:  NAME:  Kathya Rogel  MRN:               [id number]  YOB: 1995     23 y.o. female  at 41w1d.    Subjective: Resting with double peanut ball.  Feeling increased pressure.    positive  For CTXS.   negative Feels pain   negative for LOF  negative for vaginal bleeding.   positive for fetal movement    Objective:    Vitals:    18 1409 18 1426 18 1430 18 1439   BP: 116/56 110/71  108/71   Pulse: (!) 120 92  97   Temp:   37.4 °C (99.4 °F)    TempSrc:   Oral    Weight:       Height:           Cervix:  9cm/90%/-2  [place]: Uterine Contractions Q3-4 minutes.   FHRM: Baseline 160s-170s, Accels, no decels, mod variability  Pitocin: 22  Pain control: epidural    Assessment: 23 y.o. female  at 41w1d.    Plan:   1. Continue current management  2. Will allow to labor down  3. Anticipate vaginal delivery

## 2018-12-23 NOTE — PROGRESS NOTES
[facility] LABOR AND DELIVERY PROGRESS NOTE    PATIENT ID:  NAME:  Kathya Rogel  MRN:               [id number]  YOB: 1995     23 y.o. female  at 41w1d.    Subjective: Sleeping comfortably on exam.    positive  For CTXS.   negative Feels pain   positive for LOF  negative for vaginal bleeding.   positive for fetal movement    Objective:    Vitals:    18 0125 18 0139 18 0154 18 0209   BP: 116/62 111/60 107/56 106/63   Pulse: (!) 108 (!) 108 90 100   Temp: 37.6 °C (99.7 °F)      TempSrc: Temporal      Weight:       Height:           Cervix:  4cm/80%/-2  [place]: Uterine Contractions Q5 minutes.   FHRM: Baseline 150s-160s, Accels to 180, occasional variable decels, mod variability  Pitocin: 12  Pain control: epidural    Assessment: 23 y.o. female  at 41w1d.    Plan:   1. Continue current management  2. Continuous fetal monitoring  3. Will recheck cervical exam around 0500 this am  4. Anticipate  delivery

## 2018-12-23 NOTE — PROGRESS NOTES
Pt comfortable with epidural, resting  Cx 5/80/-2  CTXs q 3-6 min  FHTS reactive, reassuring, no repetitve decels  Pitocin @ 16  T=99 (has received tylenol one dose)  Will follow    awestfall

## 2018-12-23 NOTE — PROGRESS NOTES
Attending note  I have been following this patient with Dr Chaudhry and agree with plan of care.   I was here this morning and helped with placement of cervical ripening balloon @ 0900.   FHTs has been reactive and reassuring all day and I have been kept informed by resident as well as checking on [facility].   Balloon removed now and Cx 4/80/-2  AROM- thick meconium; IUPC placed  Will start Pitocin   Will have epidural placed  Will follow with Dr Chaudhry- please see her documentation.    [facility]

## 2018-12-24 VITALS
WEIGHT: 205 LBS | TEMPERATURE: 97.6 F | RESPIRATION RATE: 16 BRPM | HEIGHT: 62 IN | OXYGEN SATURATION: 98 % | SYSTOLIC BLOOD PRESSURE: 117 MMHG | DIASTOLIC BLOOD PRESSURE: 77 MMHG | HEART RATE: 98 BPM | BODY MASS INDEX: 37.73 KG/M2

## 2018-12-24 LAB
ERYTHROCYTE [DISTWIDTH] IN BLOOD BY AUTOMATED COUNT: 42 FL (ref 35.9–50)
HCT VFR BLD AUTO: 29.8 % (ref 37–47)
HGB BLD-MCNC: 9.3 G/DL (ref 12–16)
MCH RBC QN AUTO: 23.2 PG (ref 27–33)
MCHC RBC AUTO-ENTMCNC: 31.2 G/DL (ref 33.6–35)
MCV RBC AUTO: 74.3 FL (ref 81.4–97.8)
PLATELET # BLD AUTO: 368 K/UL (ref 164–446)
PMV BLD AUTO: 10.3 FL (ref 9–12.9)
RBC # BLD AUTO: 4.01 M/UL (ref 4.2–5.4)
WBC # BLD AUTO: 23.4 K/UL (ref 4.8–10.8)

## 2018-12-24 PROCEDURE — 85027 COMPLETE CBC AUTOMATED: CPT

## 2018-12-24 PROCEDURE — 36415 COLL VENOUS BLD VENIPUNCTURE: CPT

## 2018-12-24 PROCEDURE — 700102 HCHG RX REV CODE 250 W/ 637 OVERRIDE(OP): Performed by: STUDENT IN AN ORGANIZED HEALTH CARE EDUCATION/TRAINING PROGRAM

## 2018-12-24 PROCEDURE — A9270 NON-COVERED ITEM OR SERVICE: HCPCS | Performed by: STUDENT IN AN ORGANIZED HEALTH CARE EDUCATION/TRAINING PROGRAM

## 2018-12-24 RX ADMIN — IBUPROFEN 600 MG: 600 TABLET, FILM COATED ORAL at 12:12

## 2018-12-24 RX ADMIN — IBUPROFEN 600 MG: 600 TABLET, FILM COATED ORAL at 05:21

## 2018-12-24 RX ADMIN — IBUPROFEN 600 MG: 600 TABLET, FILM COATED ORAL at 20:03

## 2018-12-24 ASSESSMENT — EDINBURGH POSTNATAL DEPRESSION SCALE (EPDS)
I HAVE BEEN SO UNHAPPY THAT I HAVE HAD DIFFICULTY SLEEPING: NOT AT ALL
I HAVE BEEN ABLE TO LAUGH AND SEE THE FUNNY SIDE OF THINGS: AS MUCH AS I ALWAYS COULD
THINGS HAVE BEEN GETTING ON TOP OF ME: NO, I HAVE BEEN COPING AS WELL AS EVER
I HAVE BEEN SO UNHAPPY THAT I HAVE BEEN CRYING: NO, NEVER
I HAVE BLAMED MYSELF UNNECESSARILY WHEN THINGS WENT WRONG: YES, SOME OF THE TIME
I HAVE LOOKED FORWARD WITH ENJOYMENT TO THINGS: AS MUCH AS I EVER DID
I HAVE BEEN ANXIOUS OR WORRIED FOR NO GOOD REASON: YES, SOMETIMES
THE THOUGHT OF HARMING MYSELF HAS OCCURRED TO ME: NEVER
I HAVE FELT SCARED OR PANICKY FOR NO GOOD REASON: NO, NOT AT ALL
I HAVE FELT SAD OR MISERABLE: NO, NOT AT ALL

## 2018-12-24 ASSESSMENT — PAIN SCALES - GENERAL
PAINLEVEL_OUTOF10: 5
PAINLEVEL_OUTOF10: 5
PAINLEVEL_OUTOF10: 3

## 2018-12-24 NOTE — PROGRESS NOTES
Patient received from labor and delivery to S353 . Bedside report received from Yuly Leggett RN L&D , assumed care of patient.  Patient oriented to room and surroundings, call system, skylight education channel, visiting policy, infant security including ID bands, not letting anyone take infant without photo ID, 0-10 pain scale and pain management discussed. Patient denies any pain and any needs at this time.  Patient instructed to call for assistance as needed. Assessment done.

## 2018-12-24 NOTE — DISCHARGE SUMMARY
Discharge Summary:      Kathya Rogel      Admit Date:   2018  Discharge Date:  2018     Admitting diagnosis:  Pregnancy  Labor and delivery, indication for care  Discharge Diagnosis: Status post vaginal, spontaneous.  Pregnancy Complications: none  Tubal Ligation:  no        History:  Past Medical History:   Diagnosis Date   • Anxiety    • Plantar fasciitis      OB History    Para Term  AB Living   1 1 1     1   SAB TAB Ectopic Molar Multiple Live Births           0 1      # Outcome Date GA Lbr Wolf/2nd Weight Sex Delivery Anes PTL Lv   1 Term 18 41w1d / 00:50 3.33 kg (7 lb 5.5 oz) F Vag-Spont EPI  KATE           Patient has no known allergies.  There are no active problems to display for this patient.       Hospital Course:   23 y.o. , now para 1, was admitted with the above mentioned diagnosis, underwent Induction of Labor, vaginal, spontaneous. Patient postpartum course was unremarkable, with progressive advancement in diet , ambulation and toleration of oral analgesia. Patient without complaints today and desires discharge.      Vitals:    18 1753 18 0100 18 0400   BP: 107/57 116/66 113/57 118/76   Pulse: 85 84 79 99   Resp:  17    Temp: 37.4 °C (99.3 °F) 36.3 °C (97.4 °F) 36.3 °C (97.4 °F) 36.9 °C (98.4 °F)   TempSrc: Oral Temporal Temporal Temporal   SpO2:  95% 96% 93%   Weight:       Height:           Current Facility-Administered Medications   Medication Dose   • ropivacaine (NAROPIN) injection     • D5LR infusion     • acetaminophen (TYLENOL) tablet 500 mg  500 mg   • ondansetron (ZOFRAN ODT) dispertab 4 mg  4 mg    Or   • ondansetron (ZOFRAN) syringe/vial injection 4 mg  4 mg   • oxytocin (PITOCIN) infusion (for postpartum)   mL/hr   • ibuprofen (MOTRIN) tablet 600 mg  600 mg   • lactated ringers infusion     • oxytocin (PITOCIN) 20 UNITS/1000ML LR (induction of labor)  0.5-20 troy-units/min   • benzocaine-menthol  (CEPACOL) lozenge 1 Lozenge  1 Lozenge       Exam:  Breast Exam: negative  Abdomen: Abdomen soft, non-tender. BS normal. No masses,  No organomegaly  Fundus Non Tender: yes  Incision: none  Perineum: perineum intact  Extremity: extremities, peripheral pulses and reflexes normal, no edema, redness or tenderness in the calves or thighs     Labs:  Recent Labs      12/22/18   0635   WBC  15.9*   RBC  4.67   HEMOGLOBIN  10.8*   HEMATOCRIT  34.8*   MCV  74.5*   MCH  23.1*   MCHC  31.0*   RDW  41.3   PLATELETCT  489*   MPV  10.5        Activity:   Discharge to home  Pelvic Rest x 6 weeks    Assessment:  normal postpartum course  Discharge Assessment: Taking adequate diet and fluids, No heavy bleeding or foul vaginal discharge , No breast redness or severe pain of breast     Follow up: .[facility] in 6 weeks for vaginal check and IUD insertion  To resume daily PNV and iron supplement if needed with hydration.   Patient to [facility] or ER if any of the following occur:  Fever over 100.5  Severe abdominal pain  Red streaks or painful masses in the breasts  Foul smelling discharge or lochia  Heavy vaginal bleeding saturating a pad per hour  S/s of PP depression     Discharge Meds:   No current outpatient prescriptions on file.       Diane Chaudhry D.O.

## 2018-12-24 NOTE — L&D DELIVERY NOTE
VAGINAL DELIVERY PRODEDURE NOTE    PATIENT ID:  NAME:  Kathya Rogel  MRN:               [id number]  YOB: 1995    On 2018 at 1520, this 23 y.o., now 41w1d G1 now P1, GBS neg female delivered via  under epidural anesthesia a viable female infant weighing 3330g with APGAR scores of 8 and 9 at one and five minutes. There was no nuchal cord.  Infant handed to RN in attendance. Cord was doubly clamped by me and cut by myself. An intact placenta was delivered spontaneously at 1734 with 3 vessel cord. Upon vaginal exam, there were no lacerations. Estimated blood loss was 350cc.  800 mg of cytotec was placed after delivery. Patient will be transferred to postpartum in stable condition and infant to  nursery.    Diane Chaudhry D.O.    Delivery attended by Dr. Vergara who was present for the entire delivery.

## 2018-12-24 NOTE — LACTATION NOTE
Lactation note:  Initial visit.  Discussed normal  behaviors and normal course of breastfeeding at 12-24-48-72 hours, and what to expect. Discussed importance of offering breast every 2-3 hours, and even if infant shows no interest, can do hand expression into infant's lips. Encouraged to do skin to skin. Discussed signs of a good latch, voiding and stooling patterns, feeding cues, stomach size, and importance of establishing milk supply with frequency of feedings.  New Beginning booklet given, and breastfeeding content reviewed.   Plan for tonight is to continue to offer breast first, if not latching well, can hand express colostrum, and refeed by spoon.  MOB's plan is to supplement with formula.    Feeding plan for home is to:  1) Attempt to breastfeed with feeding cues, but no more than 3 hours between feedings.  2) After breastfeeding, to use pump for 15 minutes.  3) Supplement with formula, per [place] lenny guidelines.     [place] feeding guidelines reviewed with parents, and they verbalized understanding.     Assisted with latch, on left side, via cradle hold. Infant did have a large clear spit-up, held infant upright, and used bulb suction. Discussed belly-to-belly alignment with MOB, and infant would do a couple of sucks then fall asleep. Showed MOB how to hand express colostrum.  MOB's nipples do invert with compression. Encouraged her to continue to work on deep latch, and skin 2 skin, with hand expression.     Information and phone number to Lactation connection provided, invited to breastfeeding circles as well.  MOB encouraged to sign up for [facility] for additional lactation support as needed.

## 2018-12-25 NOTE — DISCHARGE INSTRUCTIONS
POSTPARTUM DISCHARGE INSTRUCTIONS FOR MOM    YOB: 1995   Age: 23 y.o.               Admit Date: 2018     Discharge Date: 2018  Attending Doctor:  Cem Gomez                  Allergies:  Patient has no known allergies.    Discharged to home by car. Discharged via wheelchair, hospital escort: Yes.  Special equipment needed: Not Applicable  Belongings with: Personal  Be sure to schedule a follow-up appointment with your primary care doctor or any specialists as instructed.     Discharge Plan:   Diet Plan: Discussed  Activity Level: Discussed  Confirmed Follow up Appointment: Patient to Call and Schedule Appointment  Confirmed Symptoms Management: Discussed  Medication Reconciliation Updated: Yes  Influenza Vaccine Indication: Not indicated: Previously immunized this influenza season and > 8 years of age    REASONS TO CALL YOUR OBSTETRICIAN:  1.   Persistent fever or shaking chills (Temperature higher than 100.4)  2.   Heavy bleeding (soaking more than 1 pad per hour); Passing clots  3.   Foul odor from vagina  4.   Mastitis (Breast infection; breast pain, chills, fever, redness)  5.   Urinary pain, burning or frequency  6.   Episiotomy infection  7.   Abdominal incision infection  8.   Severe depression longer than 24 hours    HAND WASHING  · Prior to handling the baby.  · Before breastfeeding or bottle feeding baby.  · After using the bathroom or changing the baby's diaper.    WOUND CARE  Ask your physician for additional care instructions.  In general:    ·  Incision:      · Keep clean and dry.    · Do NOT lift anything heavier than your baby for up to 6 weeks.    · There should not be any opening or pus.      VAGINAL CARE  · Nothing inside vagina for 6 weeks: no sexual intercourse, tampons or douching.  · Bleeding may continue for 2-4 weeks.  Amount may vary.    · Call your physician for heavy bleeding which means soaking more than 1 pad per hour    BIRTH CONTROL  · It is possible to  "become pregnant at any time after delivery and while breastfeeding.  · Plan to discuss a method of birth control with your physician at your follow up visit. visit.    DIET AND ELIMINATION  · Eating more fiber (bran cereal, fruits, and vegetables) and drinking plenty of fluids will help to avoid constipation.  · Urinary frequency after childbirth is normal.    POSTPARTUM BLUES  During the first few days after birth, you may experience a sense of the \"blues\" which may include impatience, irritability or even crying.  These feeling come and go quickly.  However, as many as 1 in 10 women experience emotional symptoms known as postpartum depression.    Postpartum depression:  May start as early as the second or third day after delivery or take several weeks or months to develop.  Symptoms of \"blues\" are present, but are more intense:  Crying spells; loss of appetite; feelings of hopelessness or loss of control; fear of touching the baby; over concern or no concern at all about the baby; little or no concern about your own appearance/caring for yourself; and/or inability to sleep or excessive sleeping.  Contact your physician if you are experiencing any of these symptoms.    Crisis Hotline:  · [place] Crisis Hotline:  4-568-AQKIYKU  Or 1-195.707.3218  · [place] Crisis Hotline:  1-229.423.8407  Or 078-198-6885    PREVENTING SHAKEN BABY:  If you are angry or stressed, PUT THE BABY IN THE CRIB, step away, take some deep breaths, and wait until you are calm to care for the baby.  DO NOT SHAKE THE BABY.  You are not alone, call a supporter for help.    · Crisis Call Center 24/7 crisis line 061-767-8118 or 1-263.572.4081  · You can also text them, text \"ANSWER\" to 609455    QUIT SMOKING/TOBACCO USE:  I understand the use of any tobacco products increases my chance of suffering from future heart disease and could cause other illnesses which may shorten my life. Quitting the use of tobacco products is the single most important " thing I can do to improve my health. For further information on smoking / tobacco cessation call a Toll Free Quit Line at 1-924.767.3699 (*National Cancer [place]) or 1-369.962.1536 (American Lung Association) or you can access the web based program at www.lungusa.org.    · [place] Tobacco Users Help Line:  (245) 831-7586       Toll Free: 1-590.230.4407  · Quit Tobacco Program [facility] (041)450-2588    DEPRESSION / SUICIDE RISK:  As you are discharged from this [facility], it is important to learn how to keep safe from harming yourself.    Recognize the warning signs:  · Abrupt changes in personality, positive or negative- including increase in energy   · Giving away possessions  · Change in eating patterns- significant weight changes-  positive or negative  · Change in sleeping patterns- unable to sleep or sleeping all the time   · Unwillingness or inability to communicate  · Depression  · Unusual sadness, discouragement and loneliness  · Talk of wanting to die  · Neglect of personal appearance   · Rebelliousness- reckless behavior  · Withdrawal from people/activities they love  · Confusion- inability to concentrate     If you or a loved one observes any of these behaviors or has concerns about self-harm, here's what you can do:  · Talk about it- your feelings and reasons for harming yourself  · Remove any means that you might use to hurt yourself (examples: pills, rope, extension cords, firearm)  · Get professional help from the community (Mental Health, Substance Abuse, psychological counseling)  · Do not be alone:Call your Safe Contact- someone whom you trust who will be there for you.  · Call your local CRISIS HOTLINE 359-8882 or 730-857-0346  · Call your local Children's Mobile Crisis Response Team [place] (795) 101-9940 or www.Karaz  · Call the toll free National Suicide Prevention Hotlines   · National Suicide Prevention Lifeline 488-094-QKHC  (7564)  · [facility] 800-SUICIDE (542-2314)    DISCHARGE SURVEY:  Thank you for choosing [facility].  We hope we provided you with very good care.  You may be receiving a survey in the mail.  Please fill it out.  Your opinion is valuable to us.    ADDITIONAL EDUCATIONAL MATERIALS GIVEN TO PATIENT:        My signature on this form indicates that:  1.  I have reviewed and understand the above information  2.  My questions regarding this information have been answered to my satisfaction.  3.  I have formulated a plan with my discharge nurse to obtain my prescribed medication for home.

## 2018-12-25 NOTE — PROGRESS NOTES
Discharge instructions for pt and infant reviewed and all questions answered at this time. Car seat checked. Pt and infant escorted out by staff

## 2018-12-25 NOTE — PROGRESS NOTES
Called and spoke with Dr Chaudhry. Okay to discharge mom and baby to home. Mob understands she needs to supplement every 3 hours with formula. [place] lenny guidelines given.

## 2020-12-01 ENCOUNTER — HOSPITAL ENCOUNTER (EMERGENCY)
Facility: MEDICAL CENTER | Age: 25
End: 2020-12-01
Attending: EMERGENCY MEDICINE
Payer: MEDICAID

## 2020-12-01 ENCOUNTER — APPOINTMENT (OUTPATIENT)
Dept: RADIOLOGY | Facility: MEDICAL CENTER | Age: 25
End: 2020-12-01
Attending: EMERGENCY MEDICINE
Payer: MEDICAID

## 2020-12-01 VITALS
SYSTOLIC BLOOD PRESSURE: 100 MMHG | WEIGHT: 208.11 LBS | HEART RATE: 83 BPM | BODY MASS INDEX: 38.3 KG/M2 | RESPIRATION RATE: 18 BRPM | DIASTOLIC BLOOD PRESSURE: 57 MMHG | OXYGEN SATURATION: 100 % | HEIGHT: 62 IN | TEMPERATURE: 97.1 F

## 2020-12-01 DIAGNOSIS — R10.84 GENERALIZED ABDOMINAL PAIN: ICD-10-CM

## 2020-12-01 DIAGNOSIS — N83.202 LEFT OVARIAN CYST: ICD-10-CM

## 2020-12-01 LAB
ALBUMIN SERPL BCP-MCNC: 4 G/DL (ref 3.2–4.9)
ALBUMIN/GLOB SERPL: 1.1 G/DL
ALP SERPL-CCNC: 84 U/L (ref 30–99)
ALT SERPL-CCNC: 18 U/L (ref 2–50)
ANION GAP SERPL CALC-SCNC: 8 MMOL/L (ref 7–16)
ANISOCYTOSIS BLD QL SMEAR: ABNORMAL
APPEARANCE UR: ABNORMAL
AST SERPL-CCNC: 13 U/L (ref 12–45)
BACTERIA #/AREA URNS HPF: ABNORMAL /HPF
BASOPHILS # BLD AUTO: 0.9 % (ref 0–1.8)
BASOPHILS # BLD: 0.11 K/UL (ref 0–0.12)
BILIRUB SERPL-MCNC: 0.2 MG/DL (ref 0.1–1.5)
BILIRUB UR QL STRIP.AUTO: NEGATIVE
BUN SERPL-MCNC: 12 MG/DL (ref 8–22)
C TRACH DNA SPEC QL NAA+PROBE: NEGATIVE
CALCIUM SERPL-MCNC: 9.5 MG/DL (ref 8.5–10.5)
CHLORIDE SERPL-SCNC: 106 MMOL/L (ref 96–112)
CO2 SERPL-SCNC: 23 MMOL/L (ref 20–33)
COLOR UR: YELLOW
CREAT SERPL-MCNC: 0.53 MG/DL (ref 0.5–1.4)
EOSINOPHIL # BLD AUTO: 0.11 K/UL (ref 0–0.51)
EOSINOPHIL NFR BLD: 0.9 % (ref 0–6.9)
EPI CELLS #/AREA URNS HPF: ABNORMAL /HPF
ERYTHROCYTE [DISTWIDTH] IN BLOOD BY AUTOMATED COUNT: 46.2 FL (ref 35.9–50)
GLOBULIN SER CALC-MCNC: 3.6 G/DL (ref 1.9–3.5)
GLUCOSE SERPL-MCNC: 93 MG/DL (ref 65–99)
GLUCOSE UR STRIP.AUTO-MCNC: NEGATIVE MG/DL
HCG SERPL QL: NEGATIVE
HCT VFR BLD AUTO: 35 % (ref 37–47)
HGB BLD-MCNC: 10.5 G/DL (ref 12–16)
HYALINE CASTS #/AREA URNS LPF: ABNORMAL /LPF
HYPOCHROMIA BLD QL SMEAR: ABNORMAL
KETONES UR STRIP.AUTO-MCNC: NEGATIVE MG/DL
LEUKOCYTE ESTERASE UR QL STRIP.AUTO: ABNORMAL
LIPASE SERPL-CCNC: 18 U/L (ref 11–82)
LYMPHOCYTES # BLD AUTO: 4.92 K/UL (ref 1–4.8)
LYMPHOCYTES NFR BLD: 41.7 % (ref 22–41)
MANUAL DIFF BLD: NORMAL
MCH RBC QN AUTO: 22 PG (ref 27–33)
MCHC RBC AUTO-ENTMCNC: 30 G/DL (ref 33.6–35)
MCV RBC AUTO: 73.2 FL (ref 81.4–97.8)
MICRO URNS: ABNORMAL
MICROCYTES BLD QL SMEAR: ABNORMAL
MONOCYTES # BLD AUTO: 0.31 K/UL (ref 0–0.85)
MONOCYTES NFR BLD AUTO: 2.6 % (ref 0–13.4)
MORPHOLOGY BLD-IMP: NORMAL
N GONORRHOEA DNA SPEC QL NAA+PROBE: NEGATIVE
NEUTROPHILS # BLD AUTO: 6.36 K/UL (ref 2–7.15)
NEUTROPHILS NFR BLD: 53.9 % (ref 44–72)
NITRITE UR QL STRIP.AUTO: NEGATIVE
NRBC # BLD AUTO: 0 K/UL
NRBC BLD-RTO: 0 /100 WBC
PH UR STRIP.AUTO: 6 [PH] (ref 5–8)
PLATELET # BLD AUTO: 443 K/UL (ref 164–446)
PLATELET BLD QL SMEAR: NORMAL
PMV BLD AUTO: 9.6 FL (ref 9–12.9)
POTASSIUM SERPL-SCNC: 3.8 MMOL/L (ref 3.6–5.5)
PROT SERPL-MCNC: 7.6 G/DL (ref 6–8.2)
PROT UR QL STRIP: NEGATIVE MG/DL
RBC # BLD AUTO: 4.78 M/UL (ref 4.2–5.4)
RBC # URNS HPF: ABNORMAL /HPF
RBC BLD AUTO: PRESENT
RBC UR QL AUTO: NEGATIVE
SODIUM SERPL-SCNC: 137 MMOL/L (ref 135–145)
SP GR UR STRIP.AUTO: 1.02
SPECIMEN SOURCE: NORMAL
UROBILINOGEN UR STRIP.AUTO-MCNC: 0.2 MG/DL
WBC # BLD AUTO: 11.8 K/UL (ref 4.8–10.8)
WBC #/AREA URNS HPF: ABNORMAL /HPF

## 2020-12-01 PROCEDURE — A9270 NON-COVERED ITEM OR SERVICE: HCPCS | Performed by: EMERGENCY MEDICINE

## 2020-12-01 PROCEDURE — 87591 N.GONORRHOEAE DNA AMP PROB: CPT

## 2020-12-01 PROCEDURE — 96372 THER/PROPH/DIAG INJ SC/IM: CPT

## 2020-12-01 PROCEDURE — 85007 BL SMEAR W/DIFF WBC COUNT: CPT

## 2020-12-01 PROCEDURE — 87491 CHLMYD TRACH DNA AMP PROBE: CPT

## 2020-12-01 PROCEDURE — 700102 HCHG RX REV CODE 250 W/ 637 OVERRIDE(OP): Performed by: EMERGENCY MEDICINE

## 2020-12-01 PROCEDURE — 74177 CT ABD & PELVIS W/CONTRAST: CPT

## 2020-12-01 PROCEDURE — 83690 ASSAY OF LIPASE: CPT

## 2020-12-01 PROCEDURE — 84703 CHORIONIC GONADOTROPIN ASSAY: CPT

## 2020-12-01 PROCEDURE — 80053 COMPREHEN METABOLIC PANEL: CPT

## 2020-12-01 PROCEDURE — 700117 HCHG RX CONTRAST REV CODE 255: Performed by: EMERGENCY MEDICINE

## 2020-12-01 PROCEDURE — 81001 URINALYSIS AUTO W/SCOPE: CPT

## 2020-12-01 PROCEDURE — 85027 COMPLETE CBC AUTOMATED: CPT

## 2020-12-01 PROCEDURE — 96374 THER/PROPH/DIAG INJ IV PUSH: CPT

## 2020-12-01 PROCEDURE — 76856 US EXAM PELVIC COMPLETE: CPT

## 2020-12-01 PROCEDURE — 99285 EMERGENCY DEPT VISIT HI MDM: CPT

## 2020-12-01 PROCEDURE — 700111 HCHG RX REV CODE 636 W/ 250 OVERRIDE (IP): Performed by: EMERGENCY MEDICINE

## 2020-12-01 PROCEDURE — 700101 HCHG RX REV CODE 250: Performed by: EMERGENCY MEDICINE

## 2020-12-01 RX ORDER — CEFTRIAXONE SODIUM 250 MG/1
250 INJECTION, POWDER, FOR SOLUTION INTRAMUSCULAR; INTRAVENOUS ONCE
Status: COMPLETED | OUTPATIENT
Start: 2020-12-01 | End: 2020-12-01

## 2020-12-01 RX ORDER — DOXYCYCLINE 100 MG/1
100 CAPSULE ORAL 2 TIMES DAILY
Qty: 28 CAP | Refills: 0 | Status: SHIPPED | OUTPATIENT
Start: 2020-12-01 | End: 2020-12-15

## 2020-12-01 RX ORDER — KETOROLAC TROMETHAMINE 30 MG/ML
15 INJECTION, SOLUTION INTRAMUSCULAR; INTRAVENOUS ONCE
Status: COMPLETED | OUTPATIENT
Start: 2020-12-01 | End: 2020-12-01

## 2020-12-01 RX ORDER — DOXYCYCLINE 100 MG/1
100 TABLET ORAL ONCE
Status: COMPLETED | OUTPATIENT
Start: 2020-12-01 | End: 2020-12-01

## 2020-12-01 RX ADMIN — KETOROLAC TROMETHAMINE 15 MG: 30 INJECTION, SOLUTION INTRAMUSCULAR at 06:37

## 2020-12-01 RX ADMIN — IOHEXOL 100 ML: 350 INJECTION, SOLUTION INTRAVENOUS at 05:30

## 2020-12-01 RX ADMIN — DOXYCYCLINE 100 MG: 100 TABLET, FILM COATED ORAL at 10:45

## 2020-12-01 RX ADMIN — LIDOCAINE HYDROCHLORIDE 0.9 ML: 10 INJECTION, SOLUTION INFILTRATION; PERINEURAL at 10:45

## 2020-12-01 RX ADMIN — CEFTRIAXONE SODIUM 250 MG: 250 INJECTION, POWDER, FOR SOLUTION INTRAMUSCULAR; INTRAVENOUS at 10:46

## 2020-12-01 NOTE — ED PROVIDER NOTES
ED Provider Note    This patient was signed out to me by my partner.  The plan was to check her ultrasound to exclude evidence of ovarian torsion.    US-PELVIC COMPLETE (TRANSABDOMINAL/TRANSVAGINAL) (COMBO)   Final Result         1.  Irregular hypoechoic lesion in the left ovary, appearance suggests involuting cyst or hemorrhagic cyst.   2.  Hypoechoic fluid with specular echoes in the left adnexa, consider hemorrhagic fluid or abscess in the appropriate clinical setting. Correlate with negative beta-hCG to definitively exclude hemorrhagic ectopic pregnancy.      CT-ABDOMEN-PELVIS WITH   Final Result         1.  Enhancing left ovarian cysts. Residual involuting cyst.   2.  Small quantity of hemorrhage in the pelvis.   3.  Mildly prominent right lower quadrant and mesenteric root lymph nodes, appearance suggests mesenteric adenitis, consider other causes of adenopathy as clinically appropriate.            The ultrasound do not show evidence of ovarian torsion but there was a complex fluid collection that could be a ruptured ovarian cyst or possibly abscess.      Patient is reexamined at this time.  Her abdominal exam is reassuring without tenderness or peritonitis.  No pelvic exam was documented so pelvic exam was performed.      Pelvic exam shows no cervicitis normal internal and external mucosa.  Is mild cervical motion tenderness.  No fullness in the adnexa.  This is performed with Brit  RN as a chaperone.      At this point is difficult to tell if this is a ruptured ovarian cyst versus PID/TOA.  I think it is very likely just a ruptured ovarian cyst.  I have discussed the case with  Working on call for OB/GYN.  She recommends empiric treatment just in case his infection to err on the side of caution.  The patient started on Rocephin doxy here in the ED and will be prescribed 14 days of doxycycline for PID outpatient protocol.      The patient understands the nature of the findings on the ultrasound and the  fact that it looks like it is most likely a ruptured ovarian cyst but cannot exclude an abscess.  Therefore she is put on antibiotics.      Her very careful return precautions.  She will return to emerge department 24 hours for recheck if she still having symptoms.  She will return sooner for worsening pain fever or vomiting.  Otherwise she will continue her antibiotics and follow-up with either her doctor Dr. Chaudhry who placed the IUD or she will follow up with Dr. Pichardo on-call for OB/GYN.  Case is discussed with OB/GYN.    The patient's questions are answered, she is agreeable with the plan.      Please see Dr. Salcedo's complete history and physical.  The patient's vital signs remain normal.  Serial examinations have been reassuring.  Do not think she has any evidence of appendicitis.  She has no evidence of peritonitis.  The patient be discharged with the above plan.  Questions are answered she is agreeable to plan and discharged in good condition.    Diane Chaudhry D.O.  [address] [facility] [address]  [facility] [address]  [phone]    Schedule an appointment as soon as possible for a visit       [facility], Emergency Dept  [address] [facility] [address] [facility] [address] [phone]    As needed, If symptoms worsen    Sadia Pichardo M.D.  [address]  [facility] [id number] [phone]    Schedule an appointment as soon as possible for a visit in 2 days      1. Generalized abdominal pain     2. Left ovarian cyst           Dmitriy Onofre M.D.

## 2020-12-01 NOTE — ED TRIAGE NOTES
"Kathya Noelleneal Rogel   25 y.o. female   Chief Complaint   Patient presents with   • Abdominal Pain   • Nausea     The patient presents to the ED via triage for evaluation of generalized abdominal pain that started on  11/29. The patient states that the pain is accompanied by nausea. Denies the possibility of pregnancy. Denies surgical history. Denies urinary abnormalities. States that her last bm was yesterday and was normal for her.     Patient ambulatory to triage with a steady gait for above mentioned complaint.  Patient is alert and oriented, speaking in full sentences, following commands and responds appropriately to questions. Not in any apparent distress. Respirations are even and unlabored.   Patient placed in lobby. Patient educated on triage process. Patient encouraged to alert staff of any changes in status.     The patient denies knowingly being around anyone with suspected or confirmed COVID 19.    /86   Pulse 100   Temp 36.2 °C (97.1 °F) (Temporal)   Resp 18   Ht 1.575 m (5' 2\")   Wt 94.4 kg (208 lb 1.8 oz)   LMP 11/12/2020 (Exact Date)   SpO2 97%   BMI 38.06 kg/m²       "

## 2020-12-01 NOTE — ED PROVIDER NOTES
"ED Provider Note    CHIEF COMPLAINT  Chief Complaint   Patient presents with   • Abdominal Pain   • Nausea       HPI  Kathya Rogel is a 25 y.o. female who presents presents with diffuse abdominal pain since Sunday.  She notes that it started about 30 minutes after eating fast food.  She states that the pain was so severe she curled up in a ball in the fetal position.  Since then, pain has been coming and going.  No grossly bloody stool.  No vaginal bleeding or discharge.  No dysuria or hematuria.  No diarrhea.  Normal bowel movements including yesterday.  No prior abdominal surgeries.  Patient has an IUD in place.  Last menstrual cycle was in mid November.  No fever or cough.  No known recent sick contacts.  No back pain.  The pain is not well localized in the abdomen.  She states that it is rather diffuse and she feels very bloated.    REVIEW OF SYSTEMS  See HPI for further details. All other systems are negative.     PAST MEDICAL HISTORY   has a past medical history of Anxiety and Plantar fasciitis.    SOCIAL HISTORY  Social History     Tobacco Use   • Smoking status: Never Smoker   • Smokeless tobacco: Never Used   Substance and Sexual Activity   • Alcohol use: No   • Drug use: No   • Sexual activity: Not on file       SURGICAL HISTORY  patient denies any surgical history    CURRENT MEDICATIONS  Home Medications    **Home medications have not yet been reviewed for this encounter**         ALLERGIES  No Known Allergies    PHYSICAL EXAM  VITAL SIGNS: /86   Pulse 100   Temp 36.2 °C (97.1 °F) (Temporal)   Resp 18   Ht 1.575 m (5' 2\")   Wt 94.4 kg (208 lb 1.8 oz)   LMP 11/12/2020 (Exact Date)   SpO2 97%   BMI 38.06 kg/m²   Pulse ox interpretation: I interpret this pulse ox as normal.  Constitutional: Alert in no apparent distress.  HENT: No signs of trauma, Bilateral external ears normal, Nose normal.   Eyes: Pupils are equal and reactive, Conjunctiva normal, Non-icteric.   Neck: Normal range " of motion, No tenderness, Supple, No stridor.   Cardiovascular: Regular rate and rhythm.   Thorax & Lungs: Normal breath sounds, No respiratory distress, No wheezing, No chest tenderness.   Abdomen: Bowel sounds normal, Soft, No tenderness, No masses, No pulsatile masses. No peritoneal signs.  Skin: Warm, Dry, No erythema, No rash.   Back: No bony tenderness, No CVA tenderness.   Extremities: Intact distal pulses, No edema, No tenderness, No cyanosis  Musculoskeletal: Good range of motion in all major joints. No tenderness to palpation or major deformities noted.   Neurologic: Alert, Normal motor function and gait, Normal sensory function, No focal deficits noted.       DIAGNOSTIC STUDIES / PROCEDURES    LABS  Labs Reviewed   URINALYSIS - Abnormal; Notable for the following components:       Result Value    Character Cloudy (*)     Leukocyte Esterase Moderate (*)     All other components within normal limits   CBC WITH DIFFERENTIAL - Abnormal; Notable for the following components:    WBC 11.8 (*)     Hemoglobin 10.5 (*)     Hematocrit 35.0 (*)     MCV 73.2 (*)     MCH 22.0 (*)     MCHC 30.0 (*)     Lymphocytes 41.70 (*)     Lymphs (Absolute) 4.92 (*)     All other components within normal limits   COMP METABOLIC PANEL - Abnormal; Notable for the following components:    Globulin 3.6 (*)     All other components within normal limits   URINE MICROSCOPIC (W/UA) - Abnormal; Notable for the following components:    WBC 5-10 (*)     RBC 2-5 (*)     Bacteria Moderate (*)     Epithelial Cells Moderate (*)     All other components within normal limits   HCG QUAL SERUM   LIPASE   ESTIMATED GFR   DIFFERENTIAL MANUAL   PERIPHERAL SMEAR REVIEW   PLATELET ESTIMATE   MORPHOLOGY       RADIOLOGY  CT-ABDOMEN-PELVIS WITH   Final Result         1.  Enhancing left ovarian cysts. Residual involuting cyst.   2.  Small quantity of hemorrhage in the pelvis.   3.  Mildly prominent right lower quadrant and mesenteric root lymph nodes,  appearance suggests mesenteric adenitis, consider other causes of adenopathy as clinically appropriate.      US-PELVIC COMPLETE (TRANSABDOMINAL/TRANSVAGINAL) (COMBO)    (Results Pending)       COURSE & MEDICAL DECISION MAKING    Medications   iohexol (OMNIPAQUE) 350 mg/mL (100 mL Intravenous Given 12/1/20 4501)   ketorolac (TORADOL) injection 15 mg (15 mg Intravenous Given 12/1/20 6083)       Pertinent Labs & Imaging studies reviewed. (See chart for details)  25 y.o. female presenting with diffuse abdominal pain and feeling of bloating.  No vomiting though does have some nausea.  Has been ongoing for the past 3 or so days.  Was rather severe at the time of onset and patient curled up in a fetal position secondary to the pain.  Pain has been coming and going since onset.    Laboratory studies were performed.  Showing leukocytosis of uncertain significance.  Hemoglobin 10.5 which is about the patient's baseline.  Has equivocal urinalysis though no symptoms of urinary tract infection.  Denies any dysuria or back pain.  Pain is rather nonlocalized and diffuse in the abdomen though primarily in the lower abdomen/pelvic region.  No vaginal bleeding or discharge.  Last menstrual cycle was about 2 weeks ago.    CT abdomen pelvis showing possible left ovarian cyst with residual involuting cyst.  Small quantity of hemorrhage in the pelvis.  This may be from possible hemorrhagic cyst that has ruptured.  Prominent right lower quadrant mesenteric lymph nodes suggesting mesenteric adenitis.  No obvious findings to suggest appendicitis.    Patient was reevaluated at bedside.  She continues to maintain that the abdominal discomfort is lower in location though nonlocalized.  Has equal tenderness to the right lower and left lower quadrants.  She was given Toradol for pain management and pelvic ultrasound was performed for further evaluation.  Ultrasound was ordered to evaluate for possible torsion given the presence of ovarian  "cysts.    The patient was signed out to and oncoming physician to follow-up on ultrasound results.  Major concern is ovarian cysts with possible torsion.  Low suspicion for pelvic inflammatory disease however given the patient does not have active discharge or other vaginal discomfort.    Given the CT findings, with evidence of hemorrhage, I do suspect that the patient is suffering from a ruptured ovarian cyst resulting in significant pain.  If no other alternative pathology is identified on pelvic US, this will likely be the root cause of the patient's discomfort.  Recommending continued pain management and to follow-up with her primary care physician ([facility]).    The patient was instructed to follow-up with primary care physician for further management.  To return immediately for any worsening symptoms or development of any other concerning signs or symptoms. The patient verbalizes understanding in their own words.    /56   Pulse 84   Temp 36.2 °C (97.1 °F) (Temporal)   Resp 18   Ht 1.575 m (5' 2\")   Wt 94.4 kg (208 lb 1.8 oz)   LMP 11/12/2020 (Exact Date)   SpO2 99%   BMI 38.06 kg/m²     The patient was referred to primary care where they will receive further BP management.      Diane Chaudhry D.O.  [address] [address]  [phone]    Schedule an appointment as soon as possible for a visit       [facility], Emergency Dept  [address] [facility] [address] [phone]    As needed, If symptoms worsen      FINAL IMPRESSION  1. Generalized abdominal pain    2. Left ovarian cyst            Electronically signed by: Andriy Salcedo M.D., 12/1/2020 3:42 AM'    "

## 2020-12-01 NOTE — DISCHARGE INSTRUCTIONS
Your work-up today showed evidence of a ruptured ovarian cyst.  The fluid located in your pelvis is complex in nature and we cannot exclude an abscess at this time.  For this reason I started on antibiotics.  We are treating you for PID. STD tests are pending. Follow-up with your OB/GYN doctor or your doctor in 1 to 2 days.  Return to the emergency department for increasing pain, Fever, vomiting or other concerns.  Take antibiotics as prescribed.

## 2021-05-03 ENCOUNTER — NON-PROVIDER VISIT (OUTPATIENT)
Dept: MEDICAL GROUP | Facility: PHYSICIAN GROUP | Age: 26
End: 2021-05-03

## 2021-05-03 DIAGNOSIS — Z11.1 PPD SCREENING TEST: ICD-10-CM

## 2021-05-03 PROCEDURE — 86580 TB INTRADERMAL TEST: CPT | Performed by: NURSE PRACTITIONER

## 2021-05-03 NOTE — PROGRESS NOTES
Kathya Rogel is a 25 y.o. female here for a non-provider visit for PPD placement -- Step 1 of 1    Reason for PPD:  fostering    1. TB evaluation questionnaire completed by patient? Yes      -  If any answers marked yes did you contact a provider prior to placing? Not Indicated  2.  Patient notified to return to clinic for reading on: 05/05/2021 after 1:40 pm or 05/06/2021 before 1:40 pm  3.  PPD Placement documentation completed on TB evaluation questionnaire? Yes  4.  Location of TB evaluation questionnaire filed: [facility]

## 2021-05-05 ENCOUNTER — NON-PROVIDER VISIT (OUTPATIENT)
Dept: MEDICAL GROUP | Facility: PHYSICIAN GROUP | Age: 26
End: 2021-05-05

## 2021-05-05 LAB — TB WHEAL 3D P 5 TU DIAM: 0 MM

## 2021-05-05 NOTE — PROGRESS NOTES
Kathya Rogel is a 25 y.o. female here for a non-provider visit for PPD reading -- Step 1 of 1.      1.  Resulted in Epic under enter/edit results? Yes   2.  TB evaluation questionnaire scanned into chart and original given to patient?Yes      3. Was induration greater than 0 mm? No.        Routed to PCP? No

## 2021-08-19 ENCOUNTER — APPOINTMENT (OUTPATIENT)
Dept: RADIOLOGY | Facility: MEDICAL CENTER | Age: 26
End: 2021-08-19
Attending: STUDENT IN AN ORGANIZED HEALTH CARE EDUCATION/TRAINING PROGRAM
Payer: MEDICAID

## 2021-08-19 ENCOUNTER — HOSPITAL ENCOUNTER (EMERGENCY)
Facility: MEDICAL CENTER | Age: 26
End: 2021-08-19
Attending: STUDENT IN AN ORGANIZED HEALTH CARE EDUCATION/TRAINING PROGRAM
Payer: MEDICAID

## 2021-08-19 VITALS
TEMPERATURE: 98 F | HEART RATE: 90 BPM | BODY MASS INDEX: 35.7 KG/M2 | OXYGEN SATURATION: 100 % | RESPIRATION RATE: 20 BRPM | SYSTOLIC BLOOD PRESSURE: 111 MMHG | HEIGHT: 62 IN | DIASTOLIC BLOOD PRESSURE: 73 MMHG | WEIGHT: 194 LBS

## 2021-08-19 DIAGNOSIS — R07.9 ACUTE CHEST PAIN: ICD-10-CM

## 2021-08-19 DIAGNOSIS — R07.81 PLEURITIC CHEST PAIN: ICD-10-CM

## 2021-08-19 DIAGNOSIS — Z86.16 HISTORY OF COVID-19: ICD-10-CM

## 2021-08-19 LAB
ALBUMIN SERPL BCP-MCNC: 4.2 G/DL (ref 3.2–4.9)
ALBUMIN/GLOB SERPL: 1.3 G/DL
ALP SERPL-CCNC: 93 U/L (ref 30–99)
ALT SERPL-CCNC: 61 U/L (ref 2–50)
ANION GAP SERPL CALC-SCNC: 12 MMOL/L (ref 7–16)
AST SERPL-CCNC: 31 U/L (ref 12–45)
BASOPHILS # BLD AUTO: 0.7 % (ref 0–1.8)
BASOPHILS # BLD: 0.08 K/UL (ref 0–0.12)
BILIRUB SERPL-MCNC: <0.2 MG/DL (ref 0.1–1.5)
BUN SERPL-MCNC: 13 MG/DL (ref 8–22)
CALCIUM SERPL-MCNC: 9.3 MG/DL (ref 8.5–10.5)
CHLORIDE SERPL-SCNC: 104 MMOL/L (ref 96–112)
CO2 SERPL-SCNC: 23 MMOL/L (ref 20–33)
CREAT SERPL-MCNC: 0.61 MG/DL (ref 0.5–1.4)
D DIMER PPP IA.FEU-MCNC: <0.27 UG/ML (FEU) (ref 0–0.5)
EKG IMPRESSION: NORMAL
EOSINOPHIL # BLD AUTO: 0.25 K/UL (ref 0–0.51)
EOSINOPHIL NFR BLD: 2.1 % (ref 0–6.9)
ERYTHROCYTE [DISTWIDTH] IN BLOOD BY AUTOMATED COUNT: 52.2 FL (ref 35.9–50)
GLOBULIN SER CALC-MCNC: 3.3 G/DL (ref 1.9–3.5)
GLUCOSE SERPL-MCNC: 85 MG/DL (ref 65–99)
HCG SERPL QL: NEGATIVE
HCT VFR BLD AUTO: 38.9 % (ref 37–47)
HGB BLD-MCNC: 12.1 G/DL (ref 12–16)
IMM GRANULOCYTES # BLD AUTO: 0.04 K/UL (ref 0–0.11)
IMM GRANULOCYTES NFR BLD AUTO: 0.3 % (ref 0–0.9)
LYMPHOCYTES # BLD AUTO: 4.09 K/UL (ref 1–4.8)
LYMPHOCYTES NFR BLD: 34.8 % (ref 22–41)
MCH RBC QN AUTO: 24.3 PG (ref 27–33)
MCHC RBC AUTO-ENTMCNC: 31.1 G/DL (ref 33.6–35)
MCV RBC AUTO: 78.1 FL (ref 81.4–97.8)
MONOCYTES # BLD AUTO: 0.73 K/UL (ref 0–0.85)
MONOCYTES NFR BLD AUTO: 6.2 % (ref 0–13.4)
NEUTROPHILS # BLD AUTO: 6.57 K/UL (ref 2–7.15)
NEUTROPHILS NFR BLD: 55.9 % (ref 44–72)
NRBC # BLD AUTO: 0 K/UL
NRBC BLD-RTO: 0 /100 WBC
PLATELET # BLD AUTO: 431 K/UL (ref 164–446)
PMV BLD AUTO: 10.1 FL (ref 9–12.9)
POTASSIUM SERPL-SCNC: 4.2 MMOL/L (ref 3.6–5.5)
PROT SERPL-MCNC: 7.5 G/DL (ref 6–8.2)
RBC # BLD AUTO: 4.98 M/UL (ref 4.2–5.4)
SODIUM SERPL-SCNC: 139 MMOL/L (ref 135–145)
TROPONIN T SERPL-MCNC: <6 NG/L (ref 6–19)
WBC # BLD AUTO: 11.8 K/UL (ref 4.8–10.8)

## 2021-08-19 PROCEDURE — 71275 CT ANGIOGRAPHY CHEST: CPT

## 2021-08-19 PROCEDURE — 36415 COLL VENOUS BLD VENIPUNCTURE: CPT

## 2021-08-19 PROCEDURE — 99285 EMERGENCY DEPT VISIT HI MDM: CPT

## 2021-08-19 PROCEDURE — 85379 FIBRIN DEGRADATION QUANT: CPT

## 2021-08-19 PROCEDURE — 84484 ASSAY OF TROPONIN QUANT: CPT

## 2021-08-19 PROCEDURE — 700111 HCHG RX REV CODE 636 W/ 250 OVERRIDE (IP): Performed by: STUDENT IN AN ORGANIZED HEALTH CARE EDUCATION/TRAINING PROGRAM

## 2021-08-19 PROCEDURE — 93005 ELECTROCARDIOGRAM TRACING: CPT | Performed by: STUDENT IN AN ORGANIZED HEALTH CARE EDUCATION/TRAINING PROGRAM

## 2021-08-19 PROCEDURE — 80053 COMPREHEN METABOLIC PANEL: CPT

## 2021-08-19 PROCEDURE — 700102 HCHG RX REV CODE 250 W/ 637 OVERRIDE(OP): Performed by: STUDENT IN AN ORGANIZED HEALTH CARE EDUCATION/TRAINING PROGRAM

## 2021-08-19 PROCEDURE — 700117 HCHG RX CONTRAST REV CODE 255: Performed by: STUDENT IN AN ORGANIZED HEALTH CARE EDUCATION/TRAINING PROGRAM

## 2021-08-19 PROCEDURE — 71045 X-RAY EXAM CHEST 1 VIEW: CPT

## 2021-08-19 PROCEDURE — A9270 NON-COVERED ITEM OR SERVICE: HCPCS | Performed by: STUDENT IN AN ORGANIZED HEALTH CARE EDUCATION/TRAINING PROGRAM

## 2021-08-19 PROCEDURE — 85025 COMPLETE CBC W/AUTO DIFF WBC: CPT

## 2021-08-19 PROCEDURE — 93005 ELECTROCARDIOGRAM TRACING: CPT

## 2021-08-19 PROCEDURE — 84703 CHORIONIC GONADOTROPIN ASSAY: CPT

## 2021-08-19 PROCEDURE — 96374 THER/PROPH/DIAG INJ IV PUSH: CPT | Mod: XU

## 2021-08-19 PROCEDURE — 94760 N-INVAS EAR/PLS OXIMETRY 1: CPT

## 2021-08-19 RX ORDER — ACETAMINOPHEN 325 MG/1
650 TABLET ORAL ONCE
Status: COMPLETED | OUTPATIENT
Start: 2021-08-19 | End: 2021-08-19

## 2021-08-19 RX ORDER — ALBUTEROL SULFATE 90 UG/1
2 AEROSOL, METERED RESPIRATORY (INHALATION) EVERY 6 HOURS PRN
Qty: 8.5 G | Refills: 0 | Status: SHIPPED | OUTPATIENT
Start: 2021-08-19

## 2021-08-19 RX ORDER — ALBUTEROL SULFATE 90 UG/1
4 AEROSOL, METERED RESPIRATORY (INHALATION) ONCE
Status: COMPLETED | OUTPATIENT
Start: 2021-08-19 | End: 2021-08-19

## 2021-08-19 RX ADMIN — ACETAMINOPHEN 650 MG: 325 TABLET, FILM COATED ORAL at 19:22

## 2021-08-19 RX ADMIN — IOHEXOL 50 ML: 350 INJECTION, SOLUTION INTRAVENOUS at 17:44

## 2021-08-19 RX ADMIN — FENTANYL CITRATE 50 MCG: 50 INJECTION, SOLUTION INTRAMUSCULAR; INTRAVENOUS at 15:34

## 2021-08-19 RX ADMIN — ALBUTEROL SULFATE 4 PUFF: 90 AEROSOL, METERED RESPIRATORY (INHALATION) at 19:22

## 2021-08-19 ASSESSMENT — LIFESTYLE VARIABLES: DO YOU DRINK ALCOHOL: NO

## 2021-08-19 ASSESSMENT — FIBROSIS 4 INDEX: FIB4 SCORE: 0.17

## 2021-08-19 NOTE — ED TRIAGE NOTES
"Chief Complaint   Patient presents with   • Chest Pain     pt reports pain started last night, substernal. worse on inspiration and when moving arms     Pt reports COVID 2 weeks ago.   /76   Pulse 84   Temp 36.8 °C (98.2 °F) (Temporal)   Resp 15   Ht 1.575 m (5' 2\")   Wt 88 kg (194 lb 0.1 oz)   SpO2 93%   Pt informed of wait times. Educated on triage process.  Asked to return to triage RN for any new or worsening of symptoms. Thanked for patience.        "

## 2021-08-19 NOTE — ED PROVIDER NOTES
"ED Provider Note    CHIEF COMPLAINT  Chief Complaint   Patient presents with   • Chest Pain     pt reports pain started last night, substernal. worse on inspiration and when moving arms       HPI  Kathya Rogel is a 26 y.o. female who presents with sudden onset central and left-sided chest pain that woke her up from sleep last night.  Pain is worse with inspiration and with movement.  Patient reports shortness of breath, particularly with walking and felt slightly dizzy on the walk to her room here.  She denies recent cough, fever, congestion, but does report she had Covid 2 weeks ago.  She states she was feeling significantly better after the Covid and then this symptom began last night.  She denies any history of blood clots in the past, clotting disorders, non-smoker, no estrogen.  Denies hemoptysis.  Patient reports she had the [employer] Roman vaccine in May.    REVIEW OF SYSTEMS  See HPI for further details. All other systems are negative.     PAST MEDICAL HISTORY   has a past medical history of Anxiety, COVID-19, and Plantar fasciitis.    SOCIAL HISTORY  Social History     Tobacco Use   • Smoking status: Never Smoker   • Smokeless tobacco: Never Used   Substance and Sexual Activity   • Alcohol use: No   • Drug use: No   • Sexual activity: Not on file       SURGICAL HISTORY  patient denies any surgical history    CURRENT MEDICATIONS  Home Medications    **Home medications have not yet been reviewed for this encounter**         ALLERGIES  No Known Allergies    PHYSICAL EXAM  VITAL SIGNS: /73   Pulse 90   Temp 36.7 °C (98 °F) (Temporal)   Resp 20   Ht 1.575 m (5' 2\")   Wt 88 kg (194 lb 0.1 oz)   LMP 08/10/2021   SpO2 100%   BMI 35.48 kg/m²     Pulse ox interpretation: I interpret this pulse ox as normal.  Constitutional: Alert, sitting in bed, appears uncomfortable  HENT: No signs of trauma, Bilateral external ears normal, Nose normal.   Eyes: Pupils are equal and reactive, Conjunctiva " normal, Non-icteric.   Neck: Normal range of motion, No tenderness, Supple, No stridor.   Lymphatic: No lymphadenopathy noted.   Cardiovascular: Regular rate and rhythm, no murmurs.   Thorax & Lungs: Normal breath sounds, No respiratory distress, No wheezing, No chest tenderness.   Abdomen: Bowel sounds normal, Soft, No tenderness, No masses, No pulsatile masses. No peritoneal signs.  Skin: Warm, Dry, No erythema, No rash.   Back: No bony tenderness, No CVA tenderness.   Extremities: Intact distal pulses, No edema, No tenderness, No cyanosis.  Musculoskeletal: Good range of motion in all major joints. Nomajor deformities noted.   Neurologic: Alert , Normal motor function, Normal sensory function, No focal deficits noted.   Psychiatric: Affect normal, Judgment normal, Mood normal.       DIAGNOSTIC STUDIES / PROCEDURES    LABS  Results for orders placed or performed during the hospital encounter of 08/19/21   CBC with Differential   Result Value Ref Range    WBC 11.8 (H) 4.8 - 10.8 K/uL    RBC 4.98 4.20 - 5.40 M/uL    Hemoglobin 12.1 12.0 - 16.0 g/dL    Hematocrit 38.9 37.0 - 47.0 %    MCV 78.1 (L) 81.4 - 97.8 fL    MCH 24.3 (L) 27.0 - 33.0 pg    MCHC 31.1 (L) 33.6 - 35.0 g/dL    RDW 52.2 (H) 35.9 - 50.0 fL    Platelet Count 431 164 - 446 K/uL    MPV 10.1 9.0 - 12.9 fL    Neutrophils-Polys 55.90 44.00 - 72.00 %    Lymphocytes 34.80 22.00 - 41.00 %    Monocytes 6.20 0.00 - 13.40 %    Eosinophils 2.10 0.00 - 6.90 %    Basophils 0.70 0.00 - 1.80 %    Immature Granulocytes 0.30 0.00 - 0.90 %    Nucleated RBC 0.00 /100 WBC    Neutrophils (Absolute) 6.57 2.00 - 7.15 K/uL    Lymphs (Absolute) 4.09 1.00 - 4.80 K/uL    Monos (Absolute) 0.73 0.00 - 0.85 K/uL    Eos (Absolute) 0.25 0.00 - 0.51 K/uL    Baso (Absolute) 0.08 0.00 - 0.12 K/uL    Immature Granulocytes (abs) 0.04 0.00 - 0.11 K/uL    NRBC (Absolute) 0.00 K/uL   Complete Metabolic Panel (CMP)   Result Value Ref Range    Sodium 139 135 - 145 mmol/L    Potassium 4.2 3.6 -  5.5 mmol/L    Chloride 104 96 - 112 mmol/L    Co2 23 20 - 33 mmol/L    Anion Gap 12.0 7.0 - 16.0    Glucose 85 65 - 99 mg/dL    Bun 13 8 - 22 mg/dL    Creatinine 0.61 0.50 - 1.40 mg/dL    Calcium 9.3 8.5 - 10.5 mg/dL    AST(SGOT) 31 12 - 45 U/L    ALT(SGPT) 61 (H) 2 - 50 U/L    Alkaline Phosphatase 93 30 - 99 U/L    Total Bilirubin <0.2 0.1 - 1.5 mg/dL    Albumin 4.2 3.2 - 4.9 g/dL    Total Protein 7.5 6.0 - 8.2 g/dL    Globulin 3.3 1.9 - 3.5 g/dL    A-G Ratio 1.3 g/dL   Troponin   Result Value Ref Range    Troponin T <6 6 - 19 ng/L   D-DIMER   Result Value Ref Range    D-Dimer Screen <0.27 0.00 - 0.50 ug/mL (FEU)   HCG QUAL SERUM   Result Value Ref Range    Beta-Hcg Qualitative Serum Negative Negative   ESTIMATED GFR   Result Value Ref Range    GFR If African American >60 >60 mL/min/1.73 m 2    GFR If Non African American >60 >60 mL/min/1.73 m 2   EKG (NOW)   Result Value Ref Range    Report       [facility] Emergency Dept.    Test Date:  2021  Pt Name:    SHIRA BARTLETT              Department: ER  MRN:        [id number]                      Room:  Gender:     Female                       Technician: HRR  :        [id number]                   Requested By:ER TRIAGE PROTOCOL  Order #:    [id number]                    Reading MD: Kami Weiss    Measurements  Intervals                                Axis  Rate:       91                           P:          48  LA:         144                          QRS:        67  QRSD:       82                           T:          57  QT:         348  QTc:        429    Interpretive Statements  NORMAL SINUS RHYTHM  normal axis, normal intervals, no st elevations or depressions or t wave  inversions, no signs of right heart strain  Electronically Signed On 2021 15:26:44 PDT by Kami Weiss           RADIOLOGY  CT-CTA CHEST PULMONARY ARTERY W/ RECONS   Final Result      1.  No CT evidence for pulmonary emboli.   2.  No pneumonia or  pneumothorax.            DX-CHEST-PORTABLE (1 VIEW)   Final Result      No acute cardiopulmonary abnormality.              COURSE & MEDICAL DECISION MAKING  Pertinent Labs & Imaging studies reviewed. (See chart for details)  6:28 PM CTA with no PE.   6:42 PM Rechecked patient she states her pain is improved.  Updated on results and plan for discharge.    26-year-old female with recent Covid presenting with sudden onset chest pain shortness of breath started overnight.  Here her vital signs are remarkable for mild hypoxia initially 93% on room air, but patient is not tachycardic.  Given known post Covid hypercoagulability, and hypoxia, concern for PE so CTA was done which showed no evidence of PE.  No pneumothorax.  EKG shows no signs of heart strain or ischemia or dysrhythmia.  Troponin is also negative.  Pain improved after medications here.  Given albuterol and started albuterol at home, most likely post, chest pain.  Discharged home with strict return precautions.    The patient will not drink alcohol nor drive with prescribed medications. The patient will return for worsening symptoms and is stable at the time of discharge. The patient verbalizes understanding and will comply.    FINAL IMPRESSION  1. Acute chest pain Acute    2. Pleuritic chest pain Acute    3. History of COVID-19           Electronically signed by: Kami Weiss M.D., 8/19/2021 3:27 PM

## 2021-08-20 NOTE — ED NOTES
Discharge teaching and paperwork provided. All questions/concerns answered. VSS, assessment stable and PIV removed. Given information regarding prescription. Patient discharged to the care of  and ambulated out of the ED with steady gait.

## 2021-08-20 NOTE — DISCHARGE INSTRUCTIONS
As we discussed, I suspect the pain in your chest may be due to Covid but at this time it does not appear there is anything wrong with your heart and you do not have a blood clot in your lungs.  We are prescribing you an inhaler that you may use.  Otherwise use Tylenol or ibuprofen for the pain.  Return to the emergency department if your symptoms worsen, you pass out, or other concerns.

## 2021-10-27 RX ORDER — DULOXETIN HYDROCHLORIDE 20 MG/1
20 CAPSULE, DELAYED RELEASE ORAL DAILY
COMMUNITY
Start: 2021-06-11 | End: 2021-11-04 | Stop reason: SDUPTHER

## 2021-10-27 NOTE — TELEPHONE ENCOUNTER
Last seen: 08/09/21 by Dr. Fuentes  Next appt: None     Was the patient seen in the last year in this department? Yes   Does patient have an active prescription for medications requested? No   Received Request Via: Pharmacy

## 2021-11-01 RX ORDER — DULOXETIN HYDROCHLORIDE 20 MG/1
20 CAPSULE, DELAYED RELEASE ORAL DAILY
Qty: 30 CAPSULE | OUTPATIENT
Start: 2021-11-01

## 2021-11-08 RX ORDER — DULOXETIN HYDROCHLORIDE 20 MG/1
20 CAPSULE, DELAYED RELEASE ORAL DAILY
Qty: 90 CAPSULE | Refills: 3 | Status: SHIPPED | OUTPATIENT
Start: 2021-11-08 | End: 2023-01-26

## 2021-12-22 ENCOUNTER — OFFICE VISIT (OUTPATIENT)
Dept: MEDICAL GROUP | Facility: CLINIC | Age: 26
End: 2021-12-22
Payer: MEDICAID

## 2021-12-22 VITALS
WEIGHT: 206.8 LBS | TEMPERATURE: 97.3 F | SYSTOLIC BLOOD PRESSURE: 109 MMHG | BODY MASS INDEX: 38.05 KG/M2 | RESPIRATION RATE: 16 BRPM | OXYGEN SATURATION: 95 % | DIASTOLIC BLOOD PRESSURE: 74 MMHG | HEIGHT: 62 IN | HEART RATE: 73 BPM

## 2021-12-22 DIAGNOSIS — H65.02 NON-RECURRENT ACUTE SEROUS OTITIS MEDIA OF LEFT EAR: ICD-10-CM

## 2021-12-22 PROBLEM — H66.90 ACUTE OTITIS MEDIA: Status: ACTIVE | Noted: 2021-12-22

## 2021-12-22 PROBLEM — Z12.4 SCREENING FOR MALIGNANT NEOPLASM OF CERVIX: Status: ACTIVE | Noted: 2018-07-16

## 2021-12-22 PROBLEM — F41.8 MIXED ANXIETY DEPRESSIVE DISORDER: Status: ACTIVE | Noted: 2020-07-30

## 2021-12-22 PROBLEM — Z30.9 CONTRACEPTION MANAGEMENT: Status: ACTIVE | Noted: 2019-02-07

## 2021-12-22 PROBLEM — F10.10 ALCOHOL ABUSE: Status: ACTIVE | Noted: 2021-06-11

## 2021-12-22 PROBLEM — E66.01 MORBID (SEVERE) OBESITY DUE TO EXCESS CALORIES (HCC): Status: ACTIVE | Noted: 2020-11-16

## 2021-12-22 PROBLEM — R06.83 SNORING: Status: ACTIVE | Noted: 2020-11-16

## 2021-12-22 PROCEDURE — 99213 OFFICE O/P EST LOW 20 MIN: CPT | Mod: GE | Performed by: HEALTH CARE PROVIDER

## 2021-12-22 RX ORDER — DULOXETIN HYDROCHLORIDE 20 MG/1
CAPSULE, DELAYED RELEASE ORAL
COMMUNITY
Start: 2021-08-09 | End: 2021-12-22

## 2021-12-22 RX ORDER — HYDROXYZINE HYDROCHLORIDE 25 MG/1
TABLET, FILM COATED ORAL
COMMUNITY
Start: 2021-06-11

## 2021-12-22 RX ORDER — AMOXICILLIN AND CLAVULANATE POTASSIUM 875; 125 MG/1; MG/1
1 TABLET, FILM COATED ORAL 2 TIMES DAILY
Qty: 14 TABLET | Refills: 0 | Status: SHIPPED | OUTPATIENT
Start: 2021-12-22 | End: 2021-12-29

## 2021-12-22 ASSESSMENT — FIBROSIS 4 INDEX: FIB4 SCORE: 0.23

## 2021-12-22 NOTE — PROGRESS NOTES
"Subjective:     CC: L ear pain    HPI:   Kathya is a 27 yo F with history of anxiety presents today for evaluation of left ear pain. She describes URI symptoms of cough, nasal congestion and rhinorrhea that began 1-2 weeks ago. These slowly improved until she began experiencing L ear pain and fullness 5 days ago (began 12/18/2021). She states that the pain worsened for 2-3 days then stabilized. She attempted use of OTC herbal ear drops without improvement. No fever or chills. No purulent drainage.    Problem   Acute Otitis Media   Alcohol Abuse   Morbid (Severe) Obesity Due to Excess Calories (Hcc)   Snoring   Mixed Anxiety Depressive Disorder   Contraception Management   Screening for Malignant Neoplasm of Cervix       Current Outpatient Medications Ordered in Epic   Medication Sig Dispense Refill   • amoxicillin-clavulanate (AUGMENTIN) 875-125 MG Tab Take 1 Tablet by mouth 2 times a day for 7 days. 14 Tablet 0   • DULoxetine (CYMBALTA) 20 MG Cap DR Particles Take 1 Capsule by mouth every day. 90 Capsule 3   • albuterol 108 (90 Base) MCG/ACT Aero Soln inhalation aerosol Inhale 2 Puffs every 6 hours as needed for Shortness of Breath. 8.5 g 0   • hydrOXYzine HCl (ATARAX) 25 MG Tab HYDROXYZINE HCL 25 MG TABS (Patient not taking: Reported on 12/22/2021)       No current [facility]-ordered facility-administered medications on file.         ROS:  Gen: no fevers/chills, no changes in weight  Eyes: no changes in vision  ENT: no sore throat, no hearing loss, no bloody nose  Pulm: no sob, no cough  CV: no chest pain, no palpitations  GI: no nausea/vomiting, no diarrhea  : no dysuria  MSk: no myalgias  Skin: no rash  Neuro: no headaches, no numbness/tingling  Heme/Lymph: no easy bruising      Objective:     Exam:  /74 (BP Location: Right arm, Patient Position: Sitting, BP Cuff Size: Adult)   Pulse 73   Temp 36.3 °C (97.3 °F) (Temporal)   Resp 16   Ht 1.575 m (5' 2\")   Wt 93.8 kg (206 lb 12.8 oz)   SpO2 95%   BMI 37.82 " kg/m²  Body mass index is 37.82 kg/m².    Gen: Alert and oriented, No apparent distress.  HEENT: Neck is supple without lymphadenopathy, mild erythema and retraction of L TM when compared to R TM, normal auditory canal bilaterally  Lungs: Normal effort, CTA bilaterally, no wheezes, rhonchi, or rales  CV: Regular rate and rhythm. No murmurs, rubs, or gallops.  Abd:    Soft, non-tender, non-distended  Ext: No clubbing, cyanosis, edema.      Labs: None    Assessment & Plan:     26 y.o. female with the following -     Problem List Items Addressed This Visit     Acute otitis media     25 yo F presents with L ear pain, fullness and erythematous tympanic membrane most consistent with acute otitis media. Recommend 7 day course of Augmentin BID. RTC if symptoms worsen or fails to improve in 48-72 hours.         Relevant Medications    amoxicillin-clavulanate (AUGMENTIN) 875-125 MG Tab          I spent a total of 30 minutes with record review, exam, communication with the patient, communication with other providers, and documentation of this encounter.      Return in about 1 year (around 12/22/2022).        Rob Blum M.D.  [facility]  PGY-1

## 2021-12-22 NOTE — LETTER
[facility]     December 22, 2021    Patient: Kathya Rogel   YOB: 1995   Date of Visit: 12/22/2021       To Whom It May Concern:    Kathya Rogel was seen and treated in our department on 12/22/2021. She will be medically cleared to return to work on 12/23/2021.    Sincerely,     Rob Blum M.D.

## 2022-09-21 ENCOUNTER — HOSPITAL ENCOUNTER (EMERGENCY)
Facility: MEDICAL CENTER | Age: 27
End: 2022-09-21
Attending: EMERGENCY MEDICINE
Payer: COMMERCIAL

## 2022-09-21 ENCOUNTER — APPOINTMENT (OUTPATIENT)
Dept: RADIOLOGY | Facility: MEDICAL CENTER | Age: 27
End: 2022-09-21
Attending: EMERGENCY MEDICINE
Payer: COMMERCIAL

## 2022-09-21 VITALS
OXYGEN SATURATION: 96 % | WEIGHT: 215.39 LBS | HEIGHT: 62 IN | SYSTOLIC BLOOD PRESSURE: 134 MMHG | DIASTOLIC BLOOD PRESSURE: 66 MMHG | RESPIRATION RATE: 16 BRPM | HEART RATE: 70 BPM | BODY MASS INDEX: 39.64 KG/M2 | TEMPERATURE: 98.1 F

## 2022-09-21 DIAGNOSIS — S39.012A STRAIN OF LUMBAR REGION, INITIAL ENCOUNTER: ICD-10-CM

## 2022-09-21 LAB — HCG UR QL: NEGATIVE

## 2022-09-21 PROCEDURE — 99283 EMERGENCY DEPT VISIT LOW MDM: CPT

## 2022-09-21 PROCEDURE — 96372 THER/PROPH/DIAG INJ SC/IM: CPT

## 2022-09-21 PROCEDURE — 700111 HCHG RX REV CODE 636 W/ 250 OVERRIDE (IP): Performed by: EMERGENCY MEDICINE

## 2022-09-21 PROCEDURE — 72100 X-RAY EXAM L-S SPINE 2/3 VWS: CPT

## 2022-09-21 PROCEDURE — 81025 URINE PREGNANCY TEST: CPT

## 2022-09-21 RX ORDER — CYCLOBENZAPRINE HCL 5 MG
5-10 TABLET ORAL 3 TIMES DAILY PRN
Qty: 30 TABLET | Refills: 0 | Status: SHIPPED | OUTPATIENT
Start: 2022-09-21

## 2022-09-21 RX ORDER — KETOROLAC TROMETHAMINE 30 MG/ML
60 INJECTION, SOLUTION INTRAMUSCULAR; INTRAVENOUS ONCE
Status: COMPLETED | OUTPATIENT
Start: 2022-09-21 | End: 2022-09-21

## 2022-09-21 RX ADMIN — KETOROLAC TROMETHAMINE 60 MG: 30 INJECTION, SOLUTION INTRAMUSCULAR; INTRAVENOUS at 11:24

## 2022-09-21 ASSESSMENT — FIBROSIS 4 INDEX: FIB4 SCORE: 0.24

## 2022-09-21 ASSESSMENT — PAIN DESCRIPTION - PAIN TYPE: TYPE: ACUTE PAIN

## 2022-09-21 NOTE — ED TRIAGE NOTES
"Chief Complaint   Patient presents with    Low Back Pain     Pt was lifting a dresser a couple weeks ago and strained her lower back. Pt reports a 10/10 pain now and hasn't gotten worse         Pt ambulated to triage for above complaint.  Pt is AO x 4, follows commands, and responds appropriately to questions. Patient's breathing is unlabored and pain is currently 10/10 on the 0-10 pain scale.  Pt placed in lobby. Patient educated on triage process and encouraged to alert staff for any changes.      /72   Pulse 85   Temp 36 °C (96.8 °F) (Temporal)   Resp 16   Ht 1.575 m (5' 2\")   Wt 97.7 kg (215 lb 6.2 oz)   SpO2 97%     "

## 2022-09-21 NOTE — ED NOTES
PT ambulated without assistance to room. PT on monitor with call light in reach. Chart up for next available ERP.

## 2022-09-21 NOTE — ED PROVIDER NOTES
"ED Provider Note      CHIEF COMPLAINT  Chief Complaint   Patient presents with    Low Back Pain     Pt was lifting a dresser a couple weeks ago and strained her lower back. Pt reports a 10/10 pain now and hasn't gotten worse       HPI  Kathya Rogel is a 27 y.o. female who presents with back pain.  2 weeks ago patient was lifting a dresser.  Felt a stabbing pain in her mid low back.  Has been persistent since then.  This with any bending movement or twisting.  No radiation to the extremities.  No bowel or bladder dysfunction saddle anesthesia or weakness.  Has not had a fever.  Does not use IV drugs.  Never had anything like this before.  Thought will get better but because of how long its been going on she presents.        REVIEW OF SYSTEMS  Denies any weakness in the lower extremities. No bowel or bladder incontinence or saddle anesthesia. No fevers or chills. No injection drug use or IV drug abuse. No abdominal pain, nausea or vomiting. No dysuria, hematuria or flank pain.    PAST MEDICAL HISTORY  Past Medical History:   Diagnosis Date    Anxiety     COVID-19     Plantar fasciitis        FAMILY HISTORY  History reviewed. No pertinent family history.    SOCIAL HISTORY  Social History     Tobacco Use    Smoking status: Never    Smokeless tobacco: Never   Substance Use Topics    Alcohol use: No    Drug use: No       SURGICAL HISTORY  History reviewed. No pertinent surgical history.    CURRENT MEDICATIONS  Home Medications       Reviewed by Katie Ann R.N. (Registered Nurse) on 09/21/22 at 1001  Med List Status: Partial     Medication Last Dose Status   albuterol 108 (90 Base) MCG/ACT Aero Soln inhalation aerosol  Active   DULoxetine (CYMBALTA) 20 MG Cap DR Particles  Active   hydrOXYzine HCl (ATARAX) 25 MG Tab  Active                    ALLERGIES  No Known Allergies      PHYSICAL EXAM  VITAL SIGNS: /66   Pulse 70   Temp 36.7 °C (98.1 °F) (Temporal)   Resp 16   Ht 1.575 m (5' 2\")   Wt 97.7 " kg (215 lb 6.2 oz)   LMP 08/30/2022   SpO2 96%   BMI 39.40 kg/m²   Constitutional: Well developed, Well nourished, No acute distress, Non-toxic appearance. Complaining of pain  Neck: Grossly normal range of motion  Cardiovascular: Normal heart rate   Thorax & Lungs: No respiratory distress  Skin: Warm, Dry, No rash.   Extremities: No clubbing, cyanosis, edema, no Homans or cords   Neurologic: Awake alert oriented.  Symmetric motor.  Normal gait.      RADIOLOGY/PROCEDURES  DX-LUMBAR SPINE-2 OR 3 VIEWS   Final Result      Normal complete lumbar spine series.        Imaging is interpreted by radiologist       COURSE & MEDICAL DECISION MAKING    Patient presents with low back pain.  No red flags.  Given the duration of symptoms I did obtain an x-ray.  This is negative for compression or other fracture.  Patient will be treated with scheduled NSAIDs, Tylenol.  Also given a prescription for Flexeril.  Advise follow-up with primary doctor persistent symptoms.  Return to ER for weakness, numbness, fever, bowel or bladder dysfunction or concern.      FINAL IMPRESSION  1.  Acute lumbar strain        This dictation was created using voice recognition software. The accuracy of the dictation is limited to the abilities of the software.  The nursing notes were reviewed and certain aspects of this information were incorporated into this note.    Electronically signed by: Cesar Briscoe M.D., 9/21/2022 12:13 PM

## 2022-12-27 ENCOUNTER — OFFICE VISIT (OUTPATIENT)
Dept: MEDICAL GROUP | Facility: CLINIC | Age: 27
End: 2022-12-27
Payer: COMMERCIAL

## 2022-12-27 VITALS
SYSTOLIC BLOOD PRESSURE: 110 MMHG | DIASTOLIC BLOOD PRESSURE: 73 MMHG | BODY MASS INDEX: 40.48 KG/M2 | WEIGHT: 220 LBS | TEMPERATURE: 97.2 F | RESPIRATION RATE: 16 BRPM | HEIGHT: 62 IN | OXYGEN SATURATION: 96 % | HEART RATE: 83 BPM

## 2022-12-27 DIAGNOSIS — F41.8 MIXED ANXIETY DEPRESSIVE DISORDER: ICD-10-CM

## 2022-12-27 DIAGNOSIS — F10.10 ALCOHOL ABUSE: ICD-10-CM

## 2022-12-27 PROCEDURE — 99213 OFFICE O/P EST LOW 20 MIN: CPT | Mod: GE | Performed by: STUDENT IN AN ORGANIZED HEALTH CARE EDUCATION/TRAINING PROGRAM

## 2022-12-27 RX ORDER — DULOXETIN HYDROCHLORIDE 20 MG/1
20 CAPSULE, DELAYED RELEASE ORAL DAILY
Qty: 30 CAPSULE | Refills: 0 | Status: SHIPPED | OUTPATIENT
Start: 2022-12-27 | End: 2023-01-26

## 2022-12-27 ASSESSMENT — FIBROSIS 4 INDEX: FIB4 SCORE: 0.24

## 2022-12-27 NOTE — PROGRESS NOTES
"Subjective:     CC: medication refill    HPI:   Kathya presents today with needing medication refills    History of depression and anxiety. History of EtOH abuse. No drink since June 2021. Has done AA in the past. Has good support system at home -  and daughter.     Duloxetine 20 mg helps with her mood. Stable. Less angry. More interest in doing things. No SI or HI. Some insomnia and upset stomach. Taking at night.     No problems updated.    Current Outpatient Medications Ordered in Epic   Medication Sig Dispense Refill    DULoxetine (CYMBALTA) 20 MG Cap DR Particles Take 1 Capsule by mouth every day. 30 Capsule 0    DULoxetine (CYMBALTA) 20 MG Cap DR Particles Take 1 Capsule by mouth every day. 90 Capsule 3    albuterol 108 (90 Base) MCG/ACT Aero Soln inhalation aerosol Inhale 2 Puffs every 6 hours as needed for Shortness of Breath. 8.5 g 0    cyclobenzaprine (FLEXERIL) 5 mg tablet Take 1-2 Tablets by mouth 3 times a day as needed for Moderate Pain. (Patient not taking: Reported on 12/27/2022) 30 Tablet 0    hydrOXYzine HCl (ATARAX) 25 MG Tab HYDROXYZINE HCL 25 MG TABS (Patient not taking: No sig reported)       No current [facility]-ordered facility-administered medications on file.     ROS:  Gen: no changes in weight  Eyes: no changes in vision  Pulm: no sob, no cough  CV: no chest pain, no palpitations  : no dysuria  MSk: no myalgias  Skin: no rash      Objective:     Exam:  /73 (BP Location: Left arm, Patient Position: Sitting, BP Cuff Size: Adult)   Pulse 83   Temp 36.2 °C (97.2 °F) (Temporal)   Resp 16   Ht 1.575 m (5' 2\")   Wt 99.8 kg (220 lb)   SpO2 96%   BMI 40.24 kg/m²  Body mass index is 40.24 kg/m².    Gen: Alert and oriented, No apparent distress.  Neck: Neck is supple without lymphadenopathy.  Lungs: Normal effort, CTA bilaterally, no wheezes, rhonchi, or rales  CV: Regular rate and rhythm. No murmurs, rubs, or gallops.  Ext: No clubbing, cyanosis, edema.    Assessment & Plan:     27 " y.o. female with the following -     Problem List Items Addressed This Visit       Alcohol abuse    Relevant Medications    DULoxetine (CYMBALTA) 20 MG Cap DR Particles    Mixed anxiety depressive disorder    Relevant Medications    DULoxetine (CYMBALTA) 20 MG Cap DR Particles     Depression. Well-controlled on duloxetine 20 mg daily. Refill provided.     History of EtOH abuse. No drinks since June 2021. Has done AA before. Good support system at home. Feels like when mood is well-controlled she is less inclined to drink.     Has follow-up scheduled for next month

## 2023-01-26 ENCOUNTER — HOSPITAL ENCOUNTER (OUTPATIENT)
Facility: MEDICAL CENTER | Age: 28
End: 2023-01-26
Attending: FAMILY MEDICINE
Payer: COMMERCIAL

## 2023-01-26 ENCOUNTER — OFFICE VISIT (OUTPATIENT)
Dept: MEDICAL GROUP | Facility: CLINIC | Age: 28
End: 2023-01-26
Payer: COMMERCIAL

## 2023-01-26 VITALS
HEIGHT: 62 IN | WEIGHT: 211 LBS | HEART RATE: 86 BPM | BODY MASS INDEX: 38.83 KG/M2 | SYSTOLIC BLOOD PRESSURE: 122 MMHG | DIASTOLIC BLOOD PRESSURE: 80 MMHG | OXYGEN SATURATION: 98 % | TEMPERATURE: 97 F

## 2023-01-26 DIAGNOSIS — Z12.4 SCREENING FOR MALIGNANT NEOPLASM OF CERVIX: ICD-10-CM

## 2023-01-26 DIAGNOSIS — F41.8 MIXED ANXIETY DEPRESSIVE DISORDER: ICD-10-CM

## 2023-01-26 PROCEDURE — 88175 CYTOPATH C/V AUTO FLUID REDO: CPT

## 2023-01-26 PROCEDURE — 99213 OFFICE O/P EST LOW 20 MIN: CPT | Mod: GE,EP | Performed by: HEALTH CARE PROVIDER

## 2023-01-26 RX ORDER — DULOXETIN HYDROCHLORIDE 20 MG/1
20 CAPSULE, DELAYED RELEASE ORAL DAILY
Qty: 90 CAPSULE | Refills: 3 | Status: SHIPPED | OUTPATIENT
Start: 2023-01-26 | End: 2024-02-09

## 2023-01-26 ASSESSMENT — PATIENT HEALTH QUESTIONNAIRE - PHQ9: CLINICAL INTERPRETATION OF PHQ2 SCORE: 0

## 2023-01-26 ASSESSMENT — FIBROSIS 4 INDEX: FIB4 SCORE: 0.24

## 2023-01-26 NOTE — PROGRESS NOTES
Subjective:     CC:   Chief Complaint   Patient presents with    Annual Exam     Well Women Exam / Pap smear       HPI:   Kathya Rogel is a 27 y.o. female who presents for annual exam. She is feeling well and denies any complaints. She has had Mirena IUD for 4 years as contraception. No abnormal vaginal bleeding, pelvic pain or other related concerns.      She  has a past medical history of Anxiety, COVID-19, and Plantar fasciitis.  She  has no past surgical history on file.    No family history on file.    Social History     Socioeconomic History    Marital status: Single     Spouse name: Not on file    Number of children: Not on file    Years of education: Not on file    Highest education level: Not on file   Occupational History    Not on file   Tobacco Use    Smoking status: Never    Smokeless tobacco: Never   Substance and Sexual Activity    Alcohol use: No    Drug use: No    Sexual activity: Not on file   Other Topics Concern    Not on file   Social History Narrative    Not on file     Social Determinants of Health     Financial Resource Strain: Not on file   Food Insecurity: Not on file   Transportation Needs: Not on file   Physical Activity: Not on file   Stress: Not on file   Social Connections: Not on file   Intimate Partner Violence: Not on file   Housing Stability: Not on file       Patient Active Problem List    Diagnosis Date Noted    Acute otitis media 12/22/2021    Alcohol abuse 06/11/2021    Morbid (severe) obesity due to excess calories (HCC) 11/16/2020    Snoring 11/16/2020    Mixed anxiety depressive disorder 07/30/2020    Contraception management 02/07/2019    Screening for malignant neoplasm of cervix 07/16/2018         Current Outpatient Medications   Medication Sig Dispense Refill    DULoxetine (CYMBALTA) 20 MG Cap DR Particles Take 1 Capsule by mouth every day. 90 Capsule 3    cyclobenzaprine (FLEXERIL) 5 mg tablet Take 1-2 Tablets by mouth 3 times a day as needed for Moderate  "Pain. (Patient not taking: Reported on 12/27/2022) 30 Tablet 0    hydrOXYzine HCl (ATARAX) 25 MG Tab HYDROXYZINE HCL 25 MG TABS (Patient not taking: No sig reported)      albuterol 108 (90 Base) MCG/ACT Aero Soln inhalation aerosol Inhale 2 Puffs every 6 hours as needed for Shortness of Breath. (Patient not taking: Reported on 1/26/2023) 8.5 g 0     No current facility-administered medications for this visit.     Not on File    Review of Systems   Constitutional: Negative for fever, chills and malaise/fatigue.   HENT: Negative for congestion.    Eyes: Negative for pain.    Respiratory: Negative for cough and shortness of breath.  Cardiovascular: Negative for leg swelling.   Gastrointestinal: Negative for nausea, vomiting, abdominal pain and diarrhea.   Genitourinary: Negative for dysuria and hematuria.   Skin: Negative for rash.   Neurological: Negative for dizziness, focal weakness and headaches.   Endo/Heme/Allergies: Does not bleed easily.   Psychiatric/Behavioral: Negative for depression.  The patient is not nervous/anxious.      Objective:     /80 (BP Location: Right arm, Patient Position: Sitting, BP Cuff Size: Adult)   Pulse 86   Temp 36.1 °C (97 °F)   Ht 1.575 m (5' 2\")   Wt 95.7 kg (211 lb)   SpO2 98%   BMI 38.59 kg/m²   Body mass index is 38.59 kg/m².  Wt Readings from Last 4 Encounters:   01/26/23 95.7 kg (211 lb)   12/27/22 99.8 kg (220 lb)   09/21/22 97.7 kg (215 lb 6.2 oz)   12/22/21 93.8 kg (206 lb 12.8 oz)       Physical Exam:  Constitutional: Well-developed and well-nourished. Not diaphoretic. No distress.   Skin: Skin is warm and dry. No rash noted.  Head: Atraumatic without lesions.  Eyes: Conjunctivae and extraocular motions are normal. No scleral icterus.   Ears:  External ears unremarkable.   Nose: Nares patent. Septum midline. No discharge.   Mouth/Throat: Dentition is good.   Neck: Supple, trachea midline. Normal range of motion.   Lungs: Normal inspiratory effort  Breast: " Deferred  Abdomen: Soft, non tender, and without distention. Active bowel sounds in all four quadrants. No rebound, guarding, masses or HSM.  :Perineum and external genitalia normal without rash. Vagina with normal and physiologic discharge. Cervix without visible lesions or discharge. IUD strings visualized.  Extremities: No cyanosis, clubbing, erythema, nor edema. Distal pulses intact and symmetric.   Musculoskeletal: All major joints AROM full in all directions without pain.  Neurological: Alert and oriented x 3. No cranial nerve deficit.   Psychiatric:  Behavior, mood, and affect are appropriate.    A chaperone was offered to the patient during today's exam. Chaperone name: Maddie was present.    Assessment and Plan:     1. Mixed anxiety depressive disorder  DULoxetine (CYMBALTA) 20 MG Cap DR Particles      2. Screening for malignant neoplasm of cervix  PAP Only          HCM:     Labs per orders  Patient counseled about skin care, diet, supplements, prenatal vitamins, safe sex and exercise.  IUD appropriate for 8 years total (until 2027)      Follow-up: As needed or in 1 year

## 2023-01-26 NOTE — ASSESSMENT & PLAN NOTE
Anxiety/Depression improved with continued use of Cymbalta 20 mg daily. No SI or HI. Will refill at this time.

## 2023-01-26 NOTE — ASSESSMENT & PLAN NOTE
Pap testing performed without incident. IUD strings visualized. No cervical lesions or external genital lesions noted.

## 2023-01-27 LAB — CYTOLOGY REG CYTOL: NORMAL
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
## 2024-02-08 DIAGNOSIS — F41.8 MIXED ANXIETY DEPRESSIVE DISORDER: ICD-10-CM

## 2024-02-08 NOTE — TELEPHONE ENCOUNTER
Received request via: Patient    Was the patient seen in the last year in this department? No    Does the patient have an active prescription (recently filled or refills available) for medication(s) requested? No    [facility]    Does the patient have [facility] and need 100 day supply (blood pressure, diabetes and cholesterol meds only)? Patient does not have SCP

## 2024-02-09 RX ORDER — DULOXETIN HYDROCHLORIDE 20 MG/1
20 CAPSULE, DELAYED RELEASE ORAL
Qty: 90 CAPSULE | Refills: 3 | Status: SHIPPED | OUTPATIENT
Start: 2024-02-09

## 2024-12-13 ENCOUNTER — APPOINTMENT (OUTPATIENT)
Dept: MEDICAL GROUP | Facility: CLINIC | Age: 29
End: 2024-12-13
Payer: COMMERCIAL

## 2025-01-13 ENCOUNTER — APPOINTMENT (OUTPATIENT)
Dept: MEDICAL GROUP | Facility: CLINIC | Age: 30
End: 2025-01-13
Payer: COMMERCIAL

## 2025-04-07 ENCOUNTER — OFFICE VISIT (OUTPATIENT)
Dept: URGENT CARE | Facility: CLINIC | Age: 30
End: 2025-04-07
Payer: COMMERCIAL

## 2025-04-07 VITALS
BODY MASS INDEX: 34.96 KG/M2 | RESPIRATION RATE: 24 BRPM | OXYGEN SATURATION: 97 % | TEMPERATURE: 99.7 F | HEART RATE: 126 BPM | WEIGHT: 190 LBS | DIASTOLIC BLOOD PRESSURE: 84 MMHG | HEIGHT: 62 IN | SYSTOLIC BLOOD PRESSURE: 132 MMHG

## 2025-04-07 DIAGNOSIS — E86.0 DEHYDRATION: ICD-10-CM

## 2025-04-07 DIAGNOSIS — R05.1 ACUTE COUGH: ICD-10-CM

## 2025-04-07 DIAGNOSIS — R19.7 NAUSEA VOMITING AND DIARRHEA: ICD-10-CM

## 2025-04-07 DIAGNOSIS — R11.2 NAUSEA VOMITING AND DIARRHEA: ICD-10-CM

## 2025-04-07 DIAGNOSIS — A08.4 VIRAL GASTROENTERITIS: ICD-10-CM

## 2025-04-07 DIAGNOSIS — U07.1 COVID-19: ICD-10-CM

## 2025-04-07 LAB
FLUAV RNA SPEC QL NAA+PROBE: NEGATIVE
FLUBV RNA SPEC QL NAA+PROBE: NEGATIVE
RSV RNA SPEC QL NAA+PROBE: NEGATIVE
SARS-COV-2 RNA RESP QL NAA+PROBE: POSITIVE

## 2025-04-07 PROCEDURE — 99214 OFFICE O/P EST MOD 30 MIN: CPT

## 2025-04-07 PROCEDURE — 0241U POCT CEPHEID COV-2, FLU A/B, RSV - PCR: CPT

## 2025-04-07 RX ORDER — ONDANSETRON 4 MG/1
4 TABLET, ORALLY DISINTEGRATING ORAL EVERY 6 HOURS PRN
Qty: 15 TABLET | Refills: 0 | Status: SHIPPED | OUTPATIENT
Start: 2025-04-07

## 2025-04-07 RX ORDER — ONDANSETRON 4 MG/1
4 TABLET, ORALLY DISINTEGRATING ORAL ONCE
Status: COMPLETED | OUTPATIENT
Start: 2025-04-07 | End: 2025-04-07

## 2025-04-07 RX ADMIN — ONDANSETRON 4 MG: 4 TABLET, ORALLY DISINTEGRATING ORAL at 20:05

## 2025-04-08 NOTE — PROGRESS NOTES
"Subjective:     Kathya Rogel is a 29 y.o. female who presents for Body Aches (BA, fevers, vomiting x yesterday)      Nausea  This is a new problem. The current episode started yesterday. The problem occurs 2 to 4 times per day. The problem has been gradually worsening. Associated symptoms include abdominal pain, congestion, coughing, nausea, vomiting and weakness. Pertinent negatives include no fever or rash.       Review of Systems   Constitutional:  Negative for fever.   HENT:  Positive for congestion. Negative for ear discharge.    Eyes:  Negative for discharge and redness.   Respiratory:  Positive for cough. Negative for wheezing.    Gastrointestinal:  Positive for abdominal pain, nausea and vomiting. Negative for blood in stool, constipation and diarrhea.   Genitourinary:  Negative for frequency and hematuria.   Skin:  Negative for itching and rash.   Neurological:  Positive for weakness.   Endo/Heme/Allergies:  Does not bruise/bleed easily.        CURRENT MEDICATIONS:  albuterol Aers  cyclobenzaprine  DULoxetine Cpep  hydrOXYzine HCl Tabs    Allergies:   No Known Allergies    Current Problems: Kathya Rogel does not have any pertinent problems on file.  Past Surgical Hx:  No past surgical history on file.   Past Social Hx:  reports that she has never smoked. She has never used smokeless tobacco. She reports that she does not drink alcohol and does not use drugs.   Past Family Hx:  Kathya Rogel family history is not on file.     (Allergies, Medications, & Tobacco/Substance Use were reconciled by the Medical Assistant and reviewed by myself. The family history is prepopulated)       Objective:     /84   Pulse (!) 126   Temp 37.6 °C (99.7 °F) (Temporal)   Resp (!) 24   Ht 1.575 m (5' 2\")   Wt 86.2 kg (190 lb)   SpO2 97%   BMI 34.75 kg/m²     Physical Exam  Constitutional:       General: She is not in acute distress.     Appearance: Normal appearance. She is ill-appearing. " She is not toxic-appearing or diaphoretic.   HENT:      Head: Normocephalic and atraumatic.      Nose: Congestion and rhinorrhea present.   Eyes:      General: No scleral icterus.        Right eye: No discharge.         Left eye: No discharge.   Cardiovascular:      Rate and Rhythm: Regular rhythm. Tachycardia present.      Heart sounds: Normal heart sounds. No murmur heard.     No friction rub. No gallop.   Pulmonary:      Effort: Pulmonary effort is normal. No respiratory distress.      Breath sounds: No stridor. No wheezing, rhonchi or rales.   Chest:      Chest wall: No tenderness.   Abdominal:      General: Abdomen is flat. There is no distension.      Palpations: Abdomen is soft.      Tenderness: There is no abdominal tenderness. There is no guarding.   Musculoskeletal:         General: Normal range of motion.      Cervical back: No rigidity.   Skin:     General: Skin is warm and dry.   Neurological:      General: No focal deficit present.      Mental Status: She is alert and oriented to person, place, and time. Mental status is at baseline.   Psychiatric:         Behavior: Behavior normal.         Judgment: Judgment normal.         Assessment/Plan:     Kathya was seen today for body aches.    Diagnoses and all orders for this visit:    Acute cough  -     POCT CoV-2, Flu A/B, RSV by PCR    Viral gastroenteritis    COVID-19    Dehydration    Nausea vomiting and diarrhea  -     ondansetron (ZOFRAN ODT) 4 MG TABLET DISPERSIBLE; Take 1 Tablet by mouth every 6 hours as needed for Nausea/Vomiting for up to 15 doses.  -     ondansetron (Zofran ODT) dispertab 4 mg    Based on history of presenting illness, review of systems and physical exam findings, most likely etiology of nausea, vomiting diarrhea is gastroenteritis secondary to COVID-19.  Advised and counseled on  importance of oral rehydration with Pedialyte, advised to Emergency Department should she be unable to tolerate oral rehydration.  Zofran provided for  nausea.  Patient was medicated [facility].  Discussed symptomatic management with pharmacotherapy and over-the-counter medications.  On my exam I do not see any signs or symptoms consistent with a bacterial infection currently requiring oral antibiotics.  Discussed the risks the benefits and the indications of all new medications prescribed today.  Strict return and ED precautions were discussed and all questions were answered.      Differential diagnosis, natural history, supportive care, and indications for immediate follow-up discussed.    Advised the patient to follow-up with the primary care physician for recheck, reevaluation, and consideration of further management.    Please note that this dictation was created using voice recognition software. I have made reasonable attempt to correct obvious errors, but I expect that there are errors of grammar and possibly content that I did not discover before finalizing the note.    This note was electronically signed by Therese Boogie MD PhD

## 2025-04-10 ASSESSMENT — ENCOUNTER SYMPTOMS
BLOOD IN STOOL: 0
NAUSEA: 1
BRUISES/BLEEDS EASILY: 0
CONSTIPATION: 0
COUGH: 1
WHEEZING: 0
DIARRHEA: 0
WEAKNESS: 1
VOMITING: 1
ABDOMINAL PAIN: 1
EYE REDNESS: 0
FEVER: 0
EYE DISCHARGE: 0

## 2025-04-16 ENCOUNTER — OFFICE VISIT (OUTPATIENT)
Dept: MEDICAL GROUP | Facility: CLINIC | Age: 30
End: 2025-04-16
Payer: COMMERCIAL

## 2025-04-16 VITALS
SYSTOLIC BLOOD PRESSURE: 132 MMHG | WEIGHT: 214.2 LBS | OXYGEN SATURATION: 95 % | TEMPERATURE: 97.5 F | HEIGHT: 62 IN | DIASTOLIC BLOOD PRESSURE: 76 MMHG | BODY MASS INDEX: 39.42 KG/M2 | HEART RATE: 69 BPM

## 2025-04-16 DIAGNOSIS — Z97.5 IUD (INTRAUTERINE DEVICE) IN PLACE: ICD-10-CM

## 2025-04-16 DIAGNOSIS — E66.812 CLASS 2 OBESITY WITH BODY MASS INDEX (BMI) OF 39.0 TO 39.9 IN ADULT, UNSPECIFIED OBESITY TYPE, UNSPECIFIED WHETHER SERIOUS COMORBIDITY PRESENT: ICD-10-CM

## 2025-04-16 DIAGNOSIS — R68.89 COLD INTOLERANCE: ICD-10-CM

## 2025-04-16 DIAGNOSIS — Z11.59 NEED FOR HEPATITIS C SCREENING TEST: ICD-10-CM

## 2025-04-16 DIAGNOSIS — N93.9 VAGINAL BLEEDING: ICD-10-CM

## 2025-04-16 DIAGNOSIS — Z11.4 SCREENING FOR HIV (HUMAN IMMUNODEFICIENCY VIRUS): ICD-10-CM

## 2025-04-16 DIAGNOSIS — R53.83 OTHER FATIGUE: ICD-10-CM

## 2025-04-16 DIAGNOSIS — L65.9 HAIR LOSS: ICD-10-CM

## 2025-04-16 DIAGNOSIS — Z83.49 FAMILY HISTORY OF HYPOTHYROIDISM: ICD-10-CM

## 2025-04-16 PROCEDURE — 99213 OFFICE O/P EST LOW 20 MIN: CPT | Performed by: STUDENT IN AN ORGANIZED HEALTH CARE EDUCATION/TRAINING PROGRAM

## 2025-04-16 PROCEDURE — 3078F DIAST BP <80 MM HG: CPT | Performed by: STUDENT IN AN ORGANIZED HEALTH CARE EDUCATION/TRAINING PROGRAM

## 2025-04-16 PROCEDURE — 3075F SYST BP GE 130 - 139MM HG: CPT | Performed by: STUDENT IN AN ORGANIZED HEALTH CARE EDUCATION/TRAINING PROGRAM

## 2025-04-16 ASSESSMENT — PATIENT HEALTH QUESTIONNAIRE - PHQ9: CLINICAL INTERPRETATION OF PHQ2 SCORE: 0

## 2025-04-16 NOTE — PROGRESS NOTES
Subjective:     CC:   Chief Complaint   Patient presents with    Follow-Up     Check on IUD  Thinning hair        HPI:   Kathya presents today with below complaints.    #IUD/increased vaginal bleeding:  Mirena IUD placed in 2019. Wants to make sure this is in the right spot. Was having regular periods before IUD placed, still having periods with IUD but skips one month every so often. Has noticed increased duration of periods over the last couple cycles.     #hair loss  Has noticed significant hair loss over last 6 months. Has noticed in the front as well as the middle part of her hair. Feels like she has lost half of hair volume over last 6 months. No itching, pain, flaking of scalp.     Feeling more fatigued, more cold and is struggling to maintain her weight. Mother with hypothyroidism. Was normal in the past. Denies facial hair growth.     No problems updated.    Current Outpatient Medications Ordered in Epic   Medication Sig Dispense Refill    ondansetron (ZOFRAN ODT) 4 MG TABLET DISPERSIBLE Take 1 Tablet by mouth every 6 hours as needed for Nausea/Vomiting for up to 15 doses. (Patient not taking: Reported on 4/16/2025) 15 Tablet 0    DULoxetine (CYMBALTA) 20 MG Cap DR Particles TAKE ONE CAPSULE BY MOUTH EVERY DAY (Patient not taking: Reported on 4/16/2025) 90 Capsule 3    cyclobenzaprine (FLEXERIL) 5 mg tablet Take 1-2 Tablets by mouth 3 times a day as needed for Moderate Pain. (Patient not taking: Reported on 12/27/2022) 30 Tablet 0    hydrOXYzine HCl (ATARAX) 25 MG Tab HYDROXYZINE HCL 25 MG TABS (Patient not taking: No sig reported)      albuterol 108 (90 Base) MCG/ACT Aero Soln inhalation aerosol Inhale 2 Puffs every 6 hours as needed for Shortness of Breath. (Patient not taking: Reported on 1/26/2023) 8.5 g 0     No current Epic-ordered facility-administered medications on file.       Health Maintenance: Completed    ROS:  See HPI    Objective:     Exam:  /76 (BP Location: Left arm, Patient Position:  "Sitting, BP Cuff Size: Adult)   Pulse 69   Temp 36.4 °C (97.5 °F) (Temporal)   Ht 1.575 m (5' 2\")   Wt 97.2 kg (214 lb 3.2 oz)   SpO2 95%   BMI 39.18 kg/m²  Body mass index is 39.18 kg/m².    Physical Exam  Vitals and nursing note reviewed.   Constitutional:       General: She is not in acute distress.     Appearance: Normal appearance.   HENT:      Head: Normocephalic and atraumatic.      Comments: Diffuse hair thinning throughout scalp. No patches or visible lesions.   Eyes:      Extraocular Movements: Extraocular movements intact.      Conjunctiva/sclera: Conjunctivae normal.   Pulmonary:      Effort: No respiratory distress.   Musculoskeletal:         General: Normal range of motion.      Cervical back: Normal range of motion.   Neurological:      Mental Status: She is alert.   Psychiatric:         Mood and Affect: Mood normal.         Behavior: Behavior normal.         Thought Content: Thought content normal.         Judgment: Judgment normal.         Labs: No recent labs to review.    Assessment & Plan:     29 y.o. female with the following -     Assessment & Plan  Hair loss  Patient has reported diffuse hair loss over the last 6 months.  She previously had very thick hair and actually had to have thinning of her hair done when she was younger.  She is having some associated symptoms including fatigue, cold intolerance.  There is a family history of hypothyroidism in her mother.  Patient was previously screened a few years ago which was normal.  Discussed recommendation for below labs for workup for hair loss.  Differential includes thyroid disease, iron deficiency as patient does not typically eat a lot of meat.  Could include other deficiencies but will start with a low lab workup and move on from there.  Patient is agreeable to this plan.  Orders:    TSH+FREE T4    IRON/TOTAL IRON BIND; Future    FERRITIN; Future    Comp Metabolic Panel; Future    CBC WITHOUT DIFFERENTIAL; Future    Other fatigue  See " hair loss assessment and plan  Orders:    TSH+FREE T4    IRON/TOTAL IRON BIND; Future    FERRITIN; Future    Comp Metabolic Panel; Future    CBC WITHOUT DIFFERENTIAL; Future    Cold intolerance  See hair loss assessment and plan  Orders:    TSH+FREE T4    IRON/TOTAL IRON BIND; Future    FERRITIN; Future    Comp Metabolic Panel; Future    CBC WITHOUT DIFFERENTIAL; Future    Need for hepatitis C screening test  Discussed recommendation for hep C screening patient is agreeable.  Orders:    HEP C VIRUS ANTIBODY; Future    Screening for HIV (human immunodeficiency virus)  Discussed recommendation for HIV screening and patient is agreeable.  Orders:    HIV AG/AB COMBO ASSAY SCREENING; Future    Vaginal bleeding  Patient currently has a Mirena IUD placed which was placed in 2019.  She has had increase in vaginal bleeding over the last few months.  Patient is concerned about placement of IUD.  She is requesting ultrasound for evaluation of placement of IUD.  Also offered speculum exam to evaluate for strings however patient would like to move forward with ultrasound.  This was ordered today.  Orders:    US-PELVIC COMPLETE (TRANSABDOMINAL/TRANSVAGINAL) (COMBO); Future    Class 2 obesity with body mass index (BMI) of 39.0 to 39.9 in adult, unspecified obesity type, unspecified whether serious comorbidity present  BMI 39.18.  No recent labs to review for screening for diabetes.  Discussed recommendation for A1c to screen for diabetes and patient is agreeable.  Order placed today.  Orders:    HEMOGLOBIN A1C; Future        Return for Lab and imaging follow-up.      Ngoc Ibarra MD  [facility]

## 2025-08-06 ENCOUNTER — APPOINTMENT (OUTPATIENT)
Dept: MEDICAL GROUP | Facility: CLINIC | Age: 30
End: 2025-08-06
Payer: COMMERCIAL

## 2025-08-06 ASSESSMENT — PATIENT HEALTH QUESTIONNAIRE - PHQ9
SUM OF ALL RESPONSES TO PHQ QUESTIONS 1-9: 23
5. POOR APPETITE OR OVEREATING: 3 - NEARLY EVERY DAY
CLINICAL INTERPRETATION OF PHQ2 SCORE: 4

## 2025-08-06 ASSESSMENT — ANXIETY QUESTIONNAIRES
1. FEELING NERVOUS, ANXIOUS, OR ON EDGE: NEARLY EVERY DAY
2. NOT BEING ABLE TO STOP OR CONTROL WORRYING: NEARLY EVERY DAY
6. BECOMING EASILY ANNOYED OR IRRITABLE: NEARLY EVERY DAY
GAD7 TOTAL SCORE: 20
5. BEING SO RESTLESS THAT IT IS HARD TO SIT STILL: NEARLY EVERY DAY
IF YOU CHECKED OFF ANY PROBLEMS ON THIS QUESTIONNAIRE, HOW DIFFICULT HAVE THESE PROBLEMS MADE IT FOR YOU TO DO YOUR WORK, TAKE CARE OF THINGS AT HOME, OR GET ALONG WITH OTHER PEOPLE: EXTREMELY DIFFICULT
7. FEELING AFRAID AS IF SOMETHING AWFUL MIGHT HAPPEN: MORE THAN HALF THE DAYS
3. WORRYING TOO MUCH ABOUT DIFFERENT THINGS: NEARLY EVERY DAY
4. TROUBLE RELAXING: NEARLY EVERY DAY

## 2025-08-07 ASSESSMENT — ENCOUNTER SYMPTOMS
PSYCHIATRIC NEGATIVE: 1
RESPIRATORY NEGATIVE: 1
NEUROLOGICAL NEGATIVE: 1
EYES NEGATIVE: 1
CARDIOVASCULAR NEGATIVE: 1
GASTROINTESTINAL NEGATIVE: 1
CONSTITUTIONAL NEGATIVE: 1

## 2025-09-10 ENCOUNTER — APPOINTMENT (OUTPATIENT)
Dept: MEDICAL GROUP | Facility: CLINIC | Age: 30
End: 2025-09-10
Payer: COMMERCIAL